# Patient Record
Sex: FEMALE | Race: BLACK OR AFRICAN AMERICAN
[De-identification: names, ages, dates, MRNs, and addresses within clinical notes are randomized per-mention and may not be internally consistent; named-entity substitution may affect disease eponyms.]

---

## 2016-05-14 VITALS — SYSTOLIC BLOOD PRESSURE: 128 MMHG | DIASTOLIC BLOOD PRESSURE: 70 MMHG

## 2017-05-11 ENCOUNTER — HOSPITAL ENCOUNTER (EMERGENCY)
Dept: HOSPITAL 63 - ER | Age: 62
Discharge: HOME | End: 2017-05-11
Payer: COMMERCIAL

## 2017-05-11 VITALS
SYSTOLIC BLOOD PRESSURE: 126 MMHG | SYSTOLIC BLOOD PRESSURE: 126 MMHG | SYSTOLIC BLOOD PRESSURE: 126 MMHG | SYSTOLIC BLOOD PRESSURE: 126 MMHG | DIASTOLIC BLOOD PRESSURE: 61 MMHG | DIASTOLIC BLOOD PRESSURE: 61 MMHG | DIASTOLIC BLOOD PRESSURE: 61 MMHG | DIASTOLIC BLOOD PRESSURE: 61 MMHG | SYSTOLIC BLOOD PRESSURE: 126 MMHG | DIASTOLIC BLOOD PRESSURE: 61 MMHG

## 2017-05-11 VITALS — WEIGHT: 169 LBS | BODY MASS INDEX: 31.1 KG/M2 | HEIGHT: 62 IN

## 2017-05-11 DIAGNOSIS — R07.89: Primary | ICD-10-CM

## 2017-05-11 DIAGNOSIS — J45.909: ICD-10-CM

## 2017-05-11 DIAGNOSIS — R06.02: ICD-10-CM

## 2017-05-11 DIAGNOSIS — E03.9: ICD-10-CM

## 2017-05-11 DIAGNOSIS — I10: ICD-10-CM

## 2017-05-11 LAB
ALBUMIN SERPL-MCNC: 3.8 G/DL (ref 3.4–5)
ALBUMIN/GLOB SERPL: 1.1 {RATIO} (ref 1–1.7)
ALP SERPL-CCNC: 77 U/L (ref 46–116)
ALT SERPL-CCNC: 31 U/L (ref 14–59)
ANION GAP SERPL CALC-SCNC: 11 MMOL/L (ref 6–14)
AST SERPL-CCNC: 23 U/L (ref 15–37)
BASOPHILS # BLD AUTO: 0.1 X10^3/UL (ref 0–0.2)
BASOPHILS NFR BLD: 1 % (ref 0–3)
BILIRUB SERPL-MCNC: 0.3 MG/DL (ref 0.2–1)
BUN/CREAT SERPL: 18 (ref 6–20)
CA-I SERPL ISE-MCNC: 18 MG/DL (ref 7–20)
CALCIUM SERPL-MCNC: 9.2 MG/DL (ref 8.5–10.1)
CHLORIDE SERPL-SCNC: 108 MMOL/L (ref 98–107)
CO2 SERPL-SCNC: 25 MMOL/L (ref 21–32)
CREAT SERPL-MCNC: 1 MG/DL (ref 0.6–1)
EOSINOPHIL NFR BLD: 0.3 X10^3/UL (ref 0–0.7)
EOSINOPHIL NFR BLD: 6 % (ref 0–3)
ERYTHROCYTE [DISTWIDTH] IN BLOOD BY AUTOMATED COUNT: 15.1 % (ref 11.5–14.5)
GFR SERPLBLD BASED ON 1.73 SQ M-ARVRAT: 68.7 ML/MIN
GLOBULIN SER-MCNC: 3.6 G/DL (ref 2.2–3.8)
GLUCOSE SERPL-MCNC: 98 MG/DL (ref 70–99)
HCT VFR BLD CALC: 37.4 % (ref 36–47)
HGB BLD-MCNC: 12.9 G/DL (ref 12–15.5)
LYMPHOCYTES # BLD: 3 X10^3/UL (ref 1–4.8)
LYMPHOCYTES NFR BLD AUTO: 51 % (ref 24–48)
MCH RBC QN AUTO: 28 PG (ref 25–35)
MCHC RBC AUTO-ENTMCNC: 35 G/DL (ref 31–37)
MCV RBC AUTO: 82 FL (ref 79–100)
MONO #: 0.4 X10^3/UL (ref 0–1.1)
MONOCYTES NFR BLD: 7 % (ref 0–9)
NEUT #: 2.1 X10^3UL (ref 1.8–7.7)
NEUTROPHILS NFR BLD AUTO: 36 % (ref 31–73)
PLATELET # BLD AUTO: 198 X10^3/UL (ref 140–400)
POTASSIUM SERPL-SCNC: 3.6 MMOL/L (ref 3.5–5.1)
PROT SERPL-MCNC: 7.4 G/DL (ref 6.4–8.2)
RBC # BLD AUTO: 4.54 X10^6/UL (ref 3.5–5.4)
SODIUM SERPL-SCNC: 144 MMOL/L (ref 136–145)
WBC # BLD AUTO: 5.9 X10^3/UL (ref 4–11)

## 2017-05-11 PROCEDURE — 93005 ELECTROCARDIOGRAM TRACING: CPT

## 2017-05-11 PROCEDURE — 85379 FIBRIN DEGRADATION QUANT: CPT

## 2017-05-11 PROCEDURE — 80053 COMPREHEN METABOLIC PANEL: CPT

## 2017-05-11 PROCEDURE — 84484 ASSAY OF TROPONIN QUANT: CPT

## 2017-05-11 PROCEDURE — 85027 COMPLETE CBC AUTOMATED: CPT

## 2017-05-11 PROCEDURE — 71010: CPT

## 2017-05-11 PROCEDURE — 83880 ASSAY OF NATRIURETIC PEPTIDE: CPT

## 2017-05-11 PROCEDURE — 36415 COLL VENOUS BLD VENIPUNCTURE: CPT

## 2017-05-11 NOTE — PHYS DOC
Past History


Past Medical History:  Asthma, Hypertension, Hyperthyroid


Past Surgical History:  Other


Smoking:  Non-smoker


Alcohol Use:  None


Drug Use:  None





Adult General


Chief Complaint


Chief Complaint:  CHEST PAIN





HPI


HPI





Patient is a 61-year-old female who presents today to the ER complaining of left

-sided sharp chest pain that started around 4 PM today. Patient reports that he'

s been intermittent and lasts for a couple seconds and is resolved. Patient 

reports that the discomfort was associated with some shortness of breath. 

Patient reports that she feels that she has multiple PVCs which she has a 

history of. Patient has any fevers shakes chills nausea vomiting diarrhea cough 

cold runny nose. Patient denies any pain radiating to her arms. Patient denies 

any weakness to her upper or lower 70s. Patient has any change in the 

discomfort to exertion or rest meals. Patient reports it does hurt sometimes 

when she takes a deep breath in. Patient reports the pain is sharp in nature 

and again last for seconds when it comes on in the gets resolved. She has a 

history of hypertension. Patient does not have a history of coronary artery 

disease, diabetes, liver longer kidney problems. Patient does have a history of 

hypothyroid. Patient had her tonsils taken out as well as a  in the 

past. Patient does not smoke drink or do drugs. Patient has no family history 

of present CAD in the past. Patient is allergic to any medications. Patient 

reports that she's had a history of PVCs and she currently has an appointment 

to see her cardiologist the  for another echocardiogram. Patient reports that 

she had an exercise stress test in  which was normal. Her cardiologist Dr. Moss from . Patient reports she had an echocardiogram at that time was also 

normal. Patient is currently pain-free in the ER and has been pain-free 

throughout her evaluation here.  Patient denies any orthopnea PND or dyspnea on 

exertion.





Patient's physical exam was unremarkable. Patient heart exam was regular rate 

and rhythm with occasional ectopic beats. Patient's lungs are clear without any 

wheezing rales or rhonchi. Patient has no bipedal edema. Patient has no calf 

tenderness or Homans sign. Patient has no split S2 or if heave.





Patient's ER workup revealed unremarkable EKG. Patient's EKG was normal sinus 

rhythm at a heart rate of 60 with multiple PVCs. Patient reports that this is 

normal for her. Patient's CBC CMP and troponin were all within normal limits. 

Patient had a slightly elevated BNP however her chest x-ray was normal with a 

normal heart size no infiltrates or effusions and no evidence of CHF.





Assessment and plan this is a 61-year-old female who presents here today 

complaining of left-sided chest discomfort that was sharp in nature and appears 

to be atypical for cardiac etiology. I discussed with the patient the 

limitations of and ER evaluation and discussed with her that although I'm 

unable to rule out or rule in a cardiac source for her discomfort she would be 

considered to be low risk for coronary artery disease. I discussed with the 

patient that I would not be able to rule out a cardiac source of her pain and 

would recommend inpatient evaluation at this time in order to rule out 

completely. Patient reports that she does not feel that in admission is 

indicated at this time and would prefer to be discharged home. Utilizing shared 

decision making I did discuss the patient all the risks and benefits of being 

admitted versus going home. I did discuss with her the potential of arrhythmia 

microinfarction death of the potential possibility if this was a cardiac 

source. Patient feels comfortable going home and feels that this is unlikely a 

cardiac etiology. Patient agrees to follow-up with her cardiologist as soon as 

possible for further workup. Patient reports that she will return to the ER if 

the pain returns. Patient reports that she will return the ER. He changes her 

mind regarding addition to the hospital.





Review of Systems


Review of Systems





Constitutional: Denies fever or chills []


Eyes: Denies change in visual acuity, redness, or eye pain []


All other review systems are negative except as documented in the history of 

present illness portion.





Allergies


Allergies





Allergies








Coded Allergies Type Severity Reaction Last Updated Verified


 


  No Known Drug Allergies    17 No











Physical Exam


Physical Exam





Constitutional: Well developed, well nourished, no acute distress, non-toxic 

appearance. []


HENT: Normocephalic, atraumatic, bilateral external ears normal, oropharynx 

moist, no oral exudates, nose normal. []


Eyes: PERRLA, EOMI, conjunctiva normal, no discharge. [] 


Neck: Normal range of motion, no tenderness, supple, no stridor. [] 


Cardiovascular:Heart rate regular with


Lungs & Thorax:  Bilateral breath sounds clear to auscultation []


Abdomen: Bowel sounds normal, soft, no tenderness, no masses, no pulsatile 

masses. [] 


Skin: Warm, dry, no erythema, no rash. [] 


Back: No tenderness, no CVA tenderness. [] 


Extremities: No tenderness, no cyanosis, no clubbing, ROM intact, no edema. [] 


Neurologic: Alert and oriented X 3, normal motor function, normal sensory 

function, no focal deficits noted. []


Psychologic: Affect normal, judgement normal, mood normal. []





Current Patient Data


Lab Results











Test


  17


21:17


 


White Blood Count 5.9 x10^3/uL 


 


Red Blood Count 4.54 x10^6/uL 


 


Hemoglobin 12.9 g/dL 


 


Hematocrit 37.4 % 


 


Mean Corpuscular Volume 82 fL 


 


Mean Corpuscular Hemoglobin 28 pg 


 


Mean Corpuscular Hemoglobin


Concent 35 g/dL 


 


 


Red Cell Distribution Width 15.1 % 


 


Platelet Count 198 x10^3/uL 


 


Neutrophils (%) (Auto) 36 % 


 


Lymphocytes (%) (Auto) 51 % 


 


Monocytes (%) (Auto) 7 % 


 


Eosinophils (%) (Auto) 6 % 


 


Basophils (%) (Auto) 1 % 


 


Neutrophils # (Auto) 2.1 x10^3uL 


 


Lymphocytes # (Auto) 3.0 x10^3/uL 


 


Monocytes # (Auto) 0.4 x10^3/uL 


 


Eosinophils # (Auto) 0.3 x10^3/uL 


 


Basophils # (Auto) 0.1 x10^3/uL 


 


D-Dimer (Stefani) 0.23 mg/L 


 


Sodium Level 144 mmol/L 


 


Potassium Level 3.6 mmol/L 


 


Chloride Level 108 mmol/L 


 


Carbon Dioxide Level 25 mmol/L 


 


Anion Gap 11 


 


Blood Urea Nitrogen 18 mg/dL 


 


Creatinine 1.0 mg/dL 


 


Estimated GFR


(Cockcroft-Gault) 68.7 


 


 


BUN/Creatinine Ratio 18 


 


Glucose Level 98 mg/dL 


 


Calcium Level 9.2 mg/dL 


 


Total Bilirubin 0.3 mg/dL 


 


Aspartate Amino Transf


(AST/SGOT) 23 U/L 


 


 


Alanine Aminotransferase


(ALT/SGPT) 31 U/L 


 


 


Alkaline Phosphatase 77 U/L 


 


Troponin I Quantitative < 0.017 ng/mL 


 


NT-Pro-B-Type Natriuretic


Peptide 371 pg/mL 


 


 


Total Protein 7.4 g/dL 


 


Albumin 3.8 g/dL 


 


Albumin/Globulin Ratio 1.1 








 Laboratory Tests








Test


  17


21:17


 


White Blood Count


  5.9 x10^3/uL


(4.0-11.0)


 


Red Blood Count


  4.54 x10^6/uL


(3.50-5.40)


 


Hemoglobin


  12.9 g/dL


(12.0-15.5)


 


Hematocrit


  37.4 %


(36.0-47.0)


 


Mean Corpuscular Volume


  82 fL ()


 


 


Mean Corpuscular Hemoglobin 28 pg (25-35)  


 


Mean Corpuscular Hemoglobin


Concent 35 g/dL


(31-37)


 


Red Cell Distribution Width


  15.1 %


(11.5-14.5)  H


 


Platelet Count


  198 x10^3/uL


(140-400)


 


Neutrophils (%) (Auto) 36 % (31-73)  


 


Lymphocytes (%) (Auto) 51 % (24-48)  H


 


Monocytes (%) (Auto) 7 % (0-9)  


 


Eosinophils (%) (Auto) 6 % (0-3)  H


 


Basophils (%) (Auto) 1 % (0-3)  


 


Neutrophils # (Auto)


  2.1 x10^3uL


(1.8-7.7)


 


Lymphocytes # (Auto)


  3.0 x10^3/uL


(1.0-4.8)


 


Monocytes # (Auto)


  0.4 x10^3/uL


(0.0-1.1)


 


Eosinophils # (Auto)


  0.3 x10^3/uL


(0.0-0.7)


 


Basophils # (Auto)


  0.1 x10^3/uL


(0.0-0.2)


 


D-Dimer (Stefani)


  0.23 mg/L


(0.00-0.50)


 


Sodium Level


  144 mmol/L


(136-145)


 


Potassium Level


  3.6 mmol/L


(3.5-5.1)


 


Chloride Level


  108 mmol/L


()  H


 


Carbon Dioxide Level


  25 mmol/L


(21-32)


 


Anion Gap 11 (6-14)  


 


Blood Urea Nitrogen


  18 mg/dL


(7-20)


 


Creatinine


  1.0 mg/dL


(0.6-1.0)


 


Estimated GFR


(Cockcroft-Gault) 68.7  


 


 


BUN/Creatinine Ratio 18 (6-20)  


 


Glucose Level


  98 mg/dL


(70-99)


 


Calcium Level


  9.2 mg/dL


(8.5-10.1)


 


Total Bilirubin


  0.3 mg/dL


(0.2-1.0)


 


Aspartate Amino Transferase


(AST) 23 U/L (15-37)


 


 


Alanine Aminotransferase (ALT)


  31 U/L (14-59)


 


 


Alkaline Phosphatase


  77 U/L


()


 


Troponin I Quantitative


  < 0.017 ng/mL


(0-0.055)


 


NT-Pro-B-Type Natriuretic


Peptide 371 pg/mL


(0-124)  H


 


Total Protein


  7.4 g/dL


(6.4-8.2)


 


Albumin


  3.8 g/dL


(3.4-5.0)


 


Albumin/Globulin Ratio 1.1 (1.0-1.7)  











EKG


EKG


[]





Radiology/Procedures


Radiology/Procedures


[]





Course & Med Decision Making


Course & Med Decision Making


Pertinent Labs and Imaging studies reviewed. (See chart for details)





[]





Dragon Disclaimer


Dragon Disclaimer


This chart was dictated in whole or in part using Voice Recognition software in 

a busy, high-work load, and often noisy Emergency Department environment.  It 

may contain unintended and wholly unrecognized errors or omissions.





Departure


Departure:


Impression:  


 Primary Impression:  


 Nonspecific chest pain


Disposition:  01 HOME, SELF-CARE


Condition:  IMPROVED


Referrals:  


RADHA DOUGLAS MD (PCP)


Patient Instructions:  Chest Pain (Nonspecific)





Additional Instructions:  


Please follow up with your cardiologist as soon as possible. Please call in the 

morning to make an appointment. Please return to the ER if you change your mind 

about feeling the need to be admitted to the hospital. Please return to the ER 

if you have any recurring chest pain, shortness of breath, or any other 

symptoms that are concerning to you.











MARV JACOBSEN MD May 11, 2017 22:22

## 2017-05-11 NOTE — EKG
[facility] [address], [place] [address]

Test Date:    2017               Test Time:    20:51:20

Pat Name:     DANITA BRAVO           Department:   

Patient ID:   [id number]           Room:          

Gender:       F                        Technician:   

:          [id number]               Requested By: MARV JACOBSEN

Order Number: [id number]            Reading MD:   Say Buck

                                 Measurements

Intervals                              Axis          

Rate:         69                       P:            34

KS:           136                      QRS:          15

QRSD:         78                       T:            24

QT:           402                                    

QTc:          432                                    

                           Interpretive Statements

SINUS RHYTHM

COMPLEX(ES) WITH ABERRANT INTRAVENTRICULAR CONDUCTION

VENTRICULAR PREMATURE COMPLEX(ES)



Electronically Signed On 5- 9:33:12 CDT by Say Buck

## 2017-05-12 NOTE — RAD
Exam performed: One view chest. 



Indication: chest pain today



Date of Service: 5/11/2017 11:01 PM  Comparison: 03/04/14.



Single AP upright portable view chest findings:



Cardiomediastinal silhouette is within limits of normal.  No acute

infiltrates, effusion or pneumothorax is detected.  The bony structures are

normal. 



Impression:



No acute cardiopulmonary process is detected.

## 2017-10-12 ENCOUNTER — HOSPITAL ENCOUNTER (OUTPATIENT)
Dept: HOSPITAL 63 - MAMMO | Age: 62
Discharge: HOME | End: 2017-10-12
Payer: COMMERCIAL

## 2017-10-12 DIAGNOSIS — Z12.31: Primary | ICD-10-CM

## 2017-10-13 NOTE — RAD
DATE: 10/12/2017



EXAM: DIGITAL SCREEN BILAT W/CAD



HISTORY: Screening



COMPARISON: 8/19/2016



This study was interpreted with the benefit of Computerized Aided Detection

(CAD).



FINDINGS:



Breast Density: HETERO  The breast parenchyma Is heterogeneiously dense, which

could reduce sensitivity of mammography. Breast parenchyma level C. The

history of breast reduction is noted



 There has not been a significant change in the appearance of the breasts

compared to the previous  







IMPRESSION: Benign finding







BI-RADS CATEGORY: 2 BENIGN FINDING(S)



RECOMMENDED FOLLOW-UP: 12M 12 MONTH FOLLOW-UP



PQRS compliance statement: Patient information was entered into a reminder

system with a target due date 10/12/2018 for the next mammogram.



Mammography is a sensitive method for finding small breast cancers, but it

does not detect them all and is not a substitute for careful clinical

examination.  A negative mammogram does not negate a clinically suspicious

finding and should not result in delay in biopsying a clinically suspicious

abnormality.



"Our facility is accredited by the American College [employer] Mammography

Program."

## 2017-12-21 ENCOUNTER — HOSPITAL ENCOUNTER (OUTPATIENT)
Dept: HOSPITAL 61 - PNCL | Age: 62
Discharge: HOME | End: 2017-12-21
Attending: ANESTHESIOLOGY
Payer: COMMERCIAL

## 2017-12-21 DIAGNOSIS — Z86.69: ICD-10-CM

## 2017-12-21 DIAGNOSIS — I10: ICD-10-CM

## 2017-12-21 DIAGNOSIS — E66.9: ICD-10-CM

## 2017-12-21 DIAGNOSIS — Z79.82: ICD-10-CM

## 2017-12-21 DIAGNOSIS — M50.10: ICD-10-CM

## 2017-12-21 DIAGNOSIS — Z98.890: ICD-10-CM

## 2017-12-21 DIAGNOSIS — M51.16: Primary | ICD-10-CM

## 2017-12-21 DIAGNOSIS — E03.9: ICD-10-CM

## 2017-12-21 DIAGNOSIS — F41.9: ICD-10-CM

## 2017-12-21 DIAGNOSIS — Z87.39: ICD-10-CM

## 2017-12-21 PROCEDURE — 62323 NJX INTERLAMINAR LMBR/SAC: CPT

## 2017-12-21 NOTE — PAIN
DATE OF SERVICE:  12/21/2017



PROGRESS NOTE FOR PAIN CLINIC



DIAGNOSES:  

1.  Cervical radiculopathy with cervical degenerative disk disease.

2.  Lumbar radiculopathy with lumbar degenerative disk disease.



HISTORY OF PRESENT ILLNESS:  The patient is a 62-year-old female who returns for

followup status post cervical epidural steroid injections, most recently seen on

08/23/2016.  The patient did very well near 100% improvement with her neck pain.

 Her main complaint today is low back and bilateral lower extremity pain.  The

patient has had difficulty with this about a year ago to 2 years ago and I did

very well with lumbar epidural steroid injection at that time.  The patient

reports pain across the low back into the bilateral posterior gluteus, posterior

thighs and calves radiating bilaterally, essentially right equal to left; worse

with walking, standing, change in positions.  The patient reports the pain is

aching, sharp, dull tight shooting.  Pain is stabbing, severe, becoming more

radiating and constant.  The patient reports it is worse at night, does awaken

her from sleep at least 2-3 times a night but not every night.  She needs to

reposition or sleep with a heating pad on her back and this helps.  The patient

reports pain at 8 on scale 10 on average, 10 on its worse and is a 6 at its

least and is a 6 on a scale of 10 today.  The patient reports no motor or

sensory deficits, no bowel or bladder incontinence or other complaints but still

significant pain is noted.



PHYSICAL EXAMINATION:

VITAL SIGNS:  The patient's blood pressure 131/79, pulse 65, respirations 16,

temperature 97.9 degrees Fahrenheit, height is 5 feet 2 inches and weight is 166

pounds.

GENERAL:  The patient is awake, alert, oriented, appropriate and very pleasant

demeanor.

HEENT:  Shows normocephalic and atraumatic.  Extraocular movements are intact

and symmetrical.  Oral cavity:  Mucous membranes moist and pink.  Dentition is

intact.

NECK:  Shows anterior throat supple without palpable lymphadenopathy noted. 

Swallow reflex symmetrical.

CHEST:  Shows normal with inspection.  Breath sounds clear to auscultation

bilaterally.

HEART:  Shows S1 and S2 clear.  No murmurs auscultated.

ABDOMEN:  Obese but soft, nontender and nondistended.  No palpable organomegaly

is noted.  No rebound or guarding demonstrated.

BACK:  Shows spine grossly in the midline.  Normal-appearing cervical lordosis,

thoracic kyphotic curvature and lumbar lordotic curvature.  No previous bruises,

lesions, rashes or scars are noted.  Lumbar paraspinous muscle shows symmetrical

inspection with palpation shows some moderate tenderness but only diffusely

bilaterally with palpation.  No trigger points or radiation.  The patient has

good rotational motion of the lumbar spine, both laterally greater than 10

degrees right and left well as extension greater than 10 degrees, forward

flexion 45 degrees without difficulty.  No tenderness over the sacrum or

sacroiliac regions.

EXTREMITIES:  Lower extremities show deep tendon reflexes at 2+ in the patellar,

1+ tendo-calcaneus tendons are equal.  Motor exam is strong with 5/5

dorsiflexion, extension, quadriceps and hamstring flexion equal.  Peripheral

pulses are 1+ posterior tibial and dorsalis pedis.  No peripheral edema is noted

bilaterally.



Options were discussed with the patient.  The patient's old chart was used as

well as her current medication regimen updated.  Current review of systems

updated today as well.  We will proceed with lumbar epidural steroid injections,

the first in this series with fluoroscopic guidance.  Risks were again discussed

including, but not limited to bleeding, infection, possibility of epidural

hematoma and subsequent neurological compromise, dural puncture, headaches,

spinal cord and/or nerve damage, side effects of steroid medication and poor

results regarding pain control.  The patient understands and wished to proceed. 

The patient returned to the clinic in approximately 2 weeks for followup, was

counseled to return appointment, activity level and side effects to be aware of.



DIAGNOSIS:  Lumbar radiculopathy with lumbar degenerative disk disease.



PROCEDURES:  Lumbar epidural steroid injection, translaminar approach, L5-S1

level using C-arm fluoroscopic guidance under sterile prep and drape using local

anesthetic.  



MEDICATION INJECTED:  A total of 120 mg Depo-Medrol plus 10 mL of

preservative-free normal saline and 2 mL of Isovue for contrast.



CONDITION AT DISCHARGE:  Stable.  The patient tolerated procedure well, had no

complications.

 



______________________________

ENIO ROMO MD



DR:  JOHANNE/kartik  JOB#:  [id number] / [id number]

DD:  12/21/2017 14:50  DT:  12/21/2017 23:43

## 2018-06-29 ENCOUNTER — HOSPITAL ENCOUNTER (OUTPATIENT)
Dept: HOSPITAL 63 - CT | Age: 63
Discharge: HOME | End: 2018-06-29
Attending: FAMILY MEDICINE
Payer: COMMERCIAL

## 2018-06-29 DIAGNOSIS — G43.009: ICD-10-CM

## 2018-06-29 DIAGNOSIS — I10: ICD-10-CM

## 2018-06-29 DIAGNOSIS — H53.8: Primary | ICD-10-CM

## 2018-06-29 DIAGNOSIS — J45.909: ICD-10-CM

## 2018-06-29 DIAGNOSIS — E03.9: ICD-10-CM

## 2018-06-29 PROCEDURE — 70450 CT HEAD/BRAIN W/O DYE: CPT

## 2018-06-29 NOTE — RAD
CT HEAD WITHOUT CONTRAST  6/29/2018 9:43 AM

 

Indication:   HEADACHE WITH BLURRED VISION TIMES 2 MONTHS

 

Comparison: CT of the head without contrast August 5, 2011

 

Procedure: Multidetector CT imaging of the head was performed without the 

administration of contrast.

 

Findings: There is no evidence of acute intracranial hemorrhage. There is 

no evidence of acute territorial infarction. Please note that CT is 

limited for evaluation of acute ischemia. No mass effect or midline shift 

is identified . The ventricles and basilar cisterns have an appropriate 

appearance. No abnormal extra-axial fluid collections are seen. No acute 

osseous changes are identified.

 

Impression: No evidence of acute intracranial abnormality

 

Electronically signed by: Roland Rojas MD (6/29/2018 1:38 PM) [id number]

## 2018-07-10 ENCOUNTER — HOSPITAL ENCOUNTER (OUTPATIENT)
Dept: HOSPITAL 63 - DXRAD | Age: 63
Discharge: HOME | End: 2018-07-10
Attending: FAMILY MEDICINE
Payer: COMMERCIAL

## 2018-07-10 DIAGNOSIS — I10: ICD-10-CM

## 2018-07-10 DIAGNOSIS — M77.32: Primary | ICD-10-CM

## 2018-07-10 DIAGNOSIS — G43.009: ICD-10-CM

## 2018-07-10 DIAGNOSIS — E03.9: ICD-10-CM

## 2018-07-10 DIAGNOSIS — J45.909: ICD-10-CM

## 2018-07-10 PROCEDURE — 73630 X-RAY EXAM OF FOOT: CPT

## 2018-07-10 NOTE — RAD
Three-view left foot dated 7/10/2018.

 

No comparison available.

 

Clinical indication: Pain after twisting injury.

 

FINDINGS:

 

3 views left foot show normal bony alignment. No displaced fracture. No 

acute osseous or articular abnormality. Small calcaneal spur.

 

IMPRESSION:

 

No acute radiographic abnormality.

 

Electronically signed by: Jose Wray MD (7/10/2018 3:03 PM) 

[id number]

## 2018-08-14 ENCOUNTER — HOSPITAL ENCOUNTER (INPATIENT)
Dept: HOSPITAL 63 - 1 SOUTH | Age: 63
LOS: 2 days | Discharge: HOME | DRG: 392 | End: 2018-08-16
Attending: FAMILY MEDICINE | Admitting: FAMILY MEDICINE
Payer: COMMERCIAL

## 2018-08-14 ENCOUNTER — HOSPITAL ENCOUNTER (OUTPATIENT)
Dept: HOSPITAL 63 - CT | Age: 63
Discharge: HOME | End: 2018-08-14
Attending: FAMILY MEDICINE
Payer: COMMERCIAL

## 2018-08-14 VITALS — SYSTOLIC BLOOD PRESSURE: 132 MMHG | DIASTOLIC BLOOD PRESSURE: 82 MMHG

## 2018-08-14 VITALS — BODY MASS INDEX: 30.22 KG/M2 | WEIGHT: 164.25 LBS | HEIGHT: 62 IN

## 2018-08-14 DIAGNOSIS — I10: ICD-10-CM

## 2018-08-14 DIAGNOSIS — E03.9: ICD-10-CM

## 2018-08-14 DIAGNOSIS — G43.009: ICD-10-CM

## 2018-08-14 DIAGNOSIS — Z82.49: ICD-10-CM

## 2018-08-14 DIAGNOSIS — M47.9: ICD-10-CM

## 2018-08-14 DIAGNOSIS — A09: Primary | ICD-10-CM

## 2018-08-14 DIAGNOSIS — Z82.3: ICD-10-CM

## 2018-08-14 DIAGNOSIS — Z79.899: ICD-10-CM

## 2018-08-14 DIAGNOSIS — J45.909: ICD-10-CM

## 2018-08-14 DIAGNOSIS — R10.13: Primary | ICD-10-CM

## 2018-08-14 DIAGNOSIS — Z88.8: ICD-10-CM

## 2018-08-14 LAB
ALBUMIN SERPL-MCNC: 4.6 G/DL (ref 3.4–5)
ALBUMIN/GLOB SERPL: 1 {RATIO} (ref 1–1.7)
ALP SERPL-CCNC: 93 U/L (ref 46–116)
ALT SERPL-CCNC: 24 U/L (ref 14–59)
AMYLASE SERPL-CCNC: 71 U/L (ref 25–115)
ANION GAP SERPL CALC-SCNC: 10 MMOL/L (ref 6–14)
AST SERPL-CCNC: 20 U/L (ref 15–37)
BASOPHILS # BLD AUTO: 0 X10^3/UL (ref 0–0.2)
BASOPHILS NFR BLD: 1 % (ref 0–3)
BILIRUB SERPL-MCNC: 0.6 MG/DL (ref 0.2–1)
BUN/CREAT SERPL: 14 (ref 6–20)
CA-I SERPL ISE-MCNC: 13 MG/DL (ref 7–20)
CALCIUM SERPL-MCNC: 9.9 MG/DL (ref 8.5–10.1)
CHLORIDE SERPL-SCNC: 101 MMOL/L (ref 98–107)
CO2 SERPL-SCNC: 27 MMOL/L (ref 21–32)
CREAT SERPL-MCNC: 0.9 MG/DL (ref 0.6–1)
EOSINOPHIL NFR BLD: 0.1 X10^3/UL (ref 0–0.7)
EOSINOPHIL NFR BLD: 2 % (ref 0–3)
ERYTHROCYTE [DISTWIDTH] IN BLOOD BY AUTOMATED COUNT: 15.4 % (ref 11.5–14.5)
GFR SERPLBLD BASED ON 1.73 SQ M-ARVRAT: 77 ML/MIN
GLOBULIN SER-MCNC: 4.4 G/DL (ref 2.2–3.8)
GLUCOSE SERPL-MCNC: 104 MG/DL (ref 70–99)
HCT VFR BLD CALC: 44.1 % (ref 36–47)
HGB BLD-MCNC: 14.9 G/DL (ref 12–15.5)
LIPASE: 97 U/L (ref 73–393)
LYMPHOCYTES # BLD: 2 X10^3/UL (ref 1–4.8)
LYMPHOCYTES NFR BLD AUTO: 33 % (ref 24–48)
MCH RBC QN AUTO: 29 PG (ref 25–35)
MCHC RBC AUTO-ENTMCNC: 34 G/DL (ref 31–37)
MCV RBC AUTO: 85 FL (ref 79–100)
MONO #: 0.4 X10^3/UL (ref 0–1.1)
MONOCYTES NFR BLD: 6 % (ref 0–9)
NEUT #: 3.5 X10^3UL (ref 1.8–7.7)
NEUTROPHILS NFR BLD AUTO: 58 % (ref 31–73)
PLATELET # BLD AUTO: 283 X10^3/UL (ref 140–400)
POTASSIUM SERPL-SCNC: 4 MMOL/L (ref 3.5–5.1)
PROT SERPL-MCNC: 9 G/DL (ref 6.4–8.2)
RBC # BLD AUTO: 5.18 X10^6/UL (ref 3.5–5.4)
SODIUM SERPL-SCNC: 138 MMOL/L (ref 136–145)
WBC # BLD AUTO: 6 X10^3/UL (ref 4–11)

## 2018-08-14 PROCEDURE — 83690 ASSAY OF LIPASE: CPT

## 2018-08-14 PROCEDURE — 80053 COMPREHEN METABOLIC PANEL: CPT

## 2018-08-14 PROCEDURE — 36415 COLL VENOUS BLD VENIPUNCTURE: CPT

## 2018-08-14 PROCEDURE — 82150 ASSAY OF AMYLASE: CPT

## 2018-08-14 PROCEDURE — 85025 COMPLETE CBC W/AUTO DIFF WBC: CPT

## 2018-08-14 PROCEDURE — 80048 BASIC METABOLIC PNL TOTAL CA: CPT

## 2018-08-14 PROCEDURE — 74176 CT ABD & PELVIS W/O CONTRAST: CPT

## 2018-08-14 PROCEDURE — 87045 FECES CULTURE AEROBIC BACT: CPT

## 2018-08-14 RX ADMIN — SODIUM CHLORIDE PRN MLS/HR: 0.45 INJECTION, SOLUTION INTRAVENOUS at 23:53

## 2018-08-14 NOTE — RAD
Abdominal and Pelvis CT, Without Contrast:

 

History: One week of epigastric pain.

 

Comparison: November 24, 2015.

 

Procedure: Axial images are obtained of the abdomen and pelvis, with oral 

contrast only.

 

CT Abdomen without Contrast:

 

Findings:

 

Evaluation of solid organs is limited without contrast.

 

Liver: Normal.

 

Spleen: Normal.

 

Pancreas: Normal.

 

 

Adrenal Glands: Normal.

 

Kidneys: There is a tiny punctate nonobstructive stone in the right renal 

pelvis.

 

There is no free air or free fluid.

 

There is no lymphadenopathy.

 

Impression:

 

Please see CT Pelvis without Contrast.

 

End Impression.

 

CT Pelvis without Contrast:

 

Findings:

 

The urinary bladder appears normal.

 

There is a trace of free fluid.  There is no lymphadenopathy.

 

The appendix is not well seen however there is a small tubular structure 

posterior to the cecum which could be a normal collapsed appendix. There 

is a long segment of the distal ileum and terminal ileum which is somewhat

aneurysmal and has mild wall thickening and minimal surrounding 

inflammation.

 

Impression:

 

1. Long segment of the distal ileum and terminal ileum that has mild wall 

thickening and mild aneurysmal change with minimal surrounding 

inflammation. This could be inflammatory such as Crohn's disease or could 

be infectious including Yersinia or Shigella or Salmonella. Occasionally 

small bowel lymphoma can have this appearance. 

2. Trace of free fluid.

 

PQRS Compliance Statement:

 

One or more of the following individualized dose reduction techniques were

utilized for this examination:  

1. Automated exposure control  

2. Adjustment of the mA and/or kV according to patient size  

3. Use of iterative reconstruction technique

 

Electronically signed by: George Liu III, MD (8/14/2018 8:37 PM) [facility]

## 2018-08-15 VITALS — DIASTOLIC BLOOD PRESSURE: 72 MMHG | SYSTOLIC BLOOD PRESSURE: 116 MMHG

## 2018-08-15 VITALS — DIASTOLIC BLOOD PRESSURE: 76 MMHG | SYSTOLIC BLOOD PRESSURE: 126 MMHG

## 2018-08-15 VITALS — DIASTOLIC BLOOD PRESSURE: 74 MMHG | SYSTOLIC BLOOD PRESSURE: 123 MMHG

## 2018-08-15 VITALS — SYSTOLIC BLOOD PRESSURE: 119 MMHG | DIASTOLIC BLOOD PRESSURE: 70 MMHG

## 2018-08-15 VITALS — DIASTOLIC BLOOD PRESSURE: 68 MMHG | SYSTOLIC BLOOD PRESSURE: 107 MMHG

## 2018-08-15 LAB
ANION GAP SERPL CALC-SCNC: 12 MMOL/L (ref 6–14)
BASOPHILS # BLD AUTO: 0 X10^3/UL (ref 0–0.2)
BASOPHILS NFR BLD: 0 % (ref 0–3)
CA-I SERPL ISE-MCNC: 11 MG/DL (ref 7–20)
CALCIUM SERPL-MCNC: 8.6 MG/DL (ref 8.5–10.1)
CHLORIDE SERPL-SCNC: 102 MMOL/L (ref 98–107)
CO2 SERPL-SCNC: 26 MMOL/L (ref 21–32)
CREAT SERPL-MCNC: 1 MG/DL (ref 0.6–1)
EOSINOPHIL NFR BLD: 0.1 X10^3/UL (ref 0–0.7)
EOSINOPHIL NFR BLD: 3 % (ref 0–3)
ERYTHROCYTE [DISTWIDTH] IN BLOOD BY AUTOMATED COUNT: 15.5 % (ref 11.5–14.5)
GFR SERPLBLD BASED ON 1.73 SQ M-ARVRAT: 68.2 ML/MIN
GLUCOSE SERPL-MCNC: 113 MG/DL (ref 70–99)
HCT VFR BLD CALC: 40.8 % (ref 36–47)
HGB BLD-MCNC: 13.7 G/DL (ref 12–15.5)
LYMPHOCYTES # BLD: 1.7 X10^3/UL (ref 1–4.8)
LYMPHOCYTES NFR BLD AUTO: 34 % (ref 24–48)
MCH RBC QN AUTO: 29 PG (ref 25–35)
MCHC RBC AUTO-ENTMCNC: 34 G/DL (ref 31–37)
MCV RBC AUTO: 86 FL (ref 79–100)
MONO #: 0.3 X10^3/UL (ref 0–1.1)
MONOCYTES NFR BLD: 5 % (ref 0–9)
NEUT #: 2.9 X10^3UL (ref 1.8–7.7)
NEUTROPHILS NFR BLD AUTO: 58 % (ref 31–73)
PLATELET # BLD AUTO: 248 X10^3/UL (ref 140–400)
POTASSIUM SERPL-SCNC: 3.5 MMOL/L (ref 3.5–5.1)
RBC # BLD AUTO: 4.75 X10^6/UL (ref 3.5–5.4)
SODIUM SERPL-SCNC: 140 MMOL/L (ref 136–145)
WBC # BLD AUTO: 5.1 X10^3/UL (ref 4–11)

## 2018-08-15 RX ADMIN — ALBUTEROL SULFATE SCH PUFF: 90 AEROSOL, METERED RESPIRATORY (INHALATION) at 12:58

## 2018-08-15 RX ADMIN — FLECAINIDE ACETATE SCH MG: 50 TABLET ORAL at 21:19

## 2018-08-15 RX ADMIN — LEVOTHYROXINE SODIUM SCH MCG: 125 TABLET ORAL at 08:34

## 2018-08-15 RX ADMIN — METOPROLOL SUCCINATE SCH MG: 50 TABLET, EXTENDED RELEASE ORAL at 08:34

## 2018-08-15 RX ADMIN — SODIUM CHLORIDE PRN MLS/HR: 0.45 INJECTION, SOLUTION INTRAVENOUS at 21:38

## 2018-08-15 RX ADMIN — FLECAINIDE ACETATE SCH MG: 50 TABLET ORAL at 08:35

## 2018-08-15 RX ADMIN — SODIUM CHLORIDE PRN MLS/HR: 0.45 INJECTION, SOLUTION INTRAVENOUS at 13:00

## 2018-08-15 RX ADMIN — ALBUTEROL SULFATE SCH PUFF: 90 AEROSOL, METERED RESPIRATORY (INHALATION) at 21:00

## 2018-08-15 RX ADMIN — ALBUTEROL SULFATE SCH PUFF: 90 AEROSOL, METERED RESPIRATORY (INHALATION) at 08:36

## 2018-08-16 VITALS
SYSTOLIC BLOOD PRESSURE: 117 MMHG | SYSTOLIC BLOOD PRESSURE: 117 MMHG | DIASTOLIC BLOOD PRESSURE: 71 MMHG | DIASTOLIC BLOOD PRESSURE: 71 MMHG

## 2018-08-16 VITALS — DIASTOLIC BLOOD PRESSURE: 71 MMHG | SYSTOLIC BLOOD PRESSURE: 117 MMHG

## 2018-08-16 RX ADMIN — METOPROLOL SUCCINATE SCH MG: 50 TABLET, EXTENDED RELEASE ORAL at 08:31

## 2018-08-16 RX ADMIN — ALBUTEROL SULFATE SCH MG: 108 AEROSOL, METERED RESPIRATORY (INHALATION) at 09:00

## 2018-08-16 RX ADMIN — ALBUTEROL SULFATE SCH MG: 108 AEROSOL, METERED RESPIRATORY (INHALATION) at 09:48

## 2018-08-16 RX ADMIN — FLECAINIDE ACETATE SCH MG: 50 TABLET ORAL at 08:31

## 2018-08-16 RX ADMIN — LEVOTHYROXINE SODIUM SCH MCG: 125 TABLET ORAL at 06:07

## 2018-08-23 NOTE — DS
DATE OF DISCHARGE:  08/16/2018



HOSPITAL COURSE:  A 63-year-old female came in and she has been suffering for

about a week and tried to hang in, continuing her abdominal pain at home.  She

came in, she was noted to have on imaging an infectious colitis.  She has done

that the day before admission.  The patient noted a long segment of the distal

ileum and terminal ileum that was thickened and some aneurysm consistent with

inflammatory bowel disease.  The patient was checked for infectious etiologies

and there was none noted.  Otherwise, the patient made good progress during the

rest of her hospitalization.  She did have IV antibiotic therapy, which seemed

to help her colitis and she made significant progress to be able to be

discharged home.  Follow up as an outpatient and make further evaluation as an

outpatient with her GI doctor.



IMPRESSION:  Colitis of the terminal ileum, abdominal pain.  The patient will be

discharged home.  See EMRAD.  Decreased activity.  Low fiber diet for 2 weeks

and then high fiber.  We will have her follow up with the GI specialist.





______________________________

RADHA DOUGLAS MD



DR:  MONROE/kartik  JOB#:  [id number] / [id number]

DD:  08/23/2018 13:13  DT:  08/23/2018 13:29

## 2018-10-08 ENCOUNTER — HOSPITAL ENCOUNTER (OUTPATIENT)
Dept: HOSPITAL 63 - US | Age: 63
Discharge: HOME | End: 2018-10-08
Payer: COMMERCIAL

## 2018-10-08 DIAGNOSIS — I10: ICD-10-CM

## 2018-10-08 DIAGNOSIS — R10.84: Primary | ICD-10-CM

## 2018-10-08 PROCEDURE — A9537 TC99M MEBROFENIN: HCPCS

## 2018-10-08 PROCEDURE — 96374 THER/PROPH/DIAG INJ IV PUSH: CPT

## 2018-10-08 PROCEDURE — 78226 HEPATOBILIARY SYSTEM IMAGING: CPT

## 2018-10-08 PROCEDURE — 96375 TX/PRO/DX INJ NEW DRUG ADDON: CPT

## 2018-10-08 PROCEDURE — 76700 US EXAM ABDOM COMPLETE: CPT

## 2018-10-08 NOTE — RAD
Radionuclide hepatobiliary scan, 10/8/2018:

 

HISTORY: Abdominal pain

 

Following IV injection of 5.2 mCi of technetium 99m Choletec there was 

prompt uptake of the radionuclide from the blood stream by the liver. 

Activity is present in the gallbladder and bile ducts at 15 minutes. Small

bowel activity developed at 25 minutes. Additional imaging was then 

performed following oral ingestion of 8 ounces of the Boost oral 

supplement. This was utilized for the ejection fraction portion of the 

study in the absence of available cholecystokinin. The gallbladder 

ejection fraction was calculated at 84 percent.

 

IMPRESSION:

1. Normal radionuclide hepatobiliary scan.

2. The gallbladder ejection fraction is 84 percent.

 

Electronically signed by: Rick Moritz, MD (10/8/2018 12:27 PM) [facility]

## 2018-10-08 NOTE — RAD
Examination: Ultrasound abdomen complete

 

HISTORY: History of abdominal pain

 

COMPARISON: None available

 

FINDINGS:

 

The visualized IVC, aorta within normal limits of dimension.

 

The visualized pancreas grossly appears unremarkable.

 

The spleen measures 8.6 cm in length.

 

The right kidney measures 9.5 cm in length. The left kidney measures 10 

cm.

 

The echogenicity liver grossly appears unremarkable.

 

The liver measures 11.6 cm. No evidence of gallstones. The common bile 

duct measures 2.8 mm in diameter.

 

 

IMPRESSION:

 

Unremarkable visualized exam.

 

Electronically signed by: John Soriano MD (10/8/2018 9:35 AM) [place] [id number]

## 2018-12-08 ENCOUNTER — HOSPITAL ENCOUNTER (INPATIENT)
Dept: HOSPITAL 63 - 1 SOUTH | Age: 63
LOS: 1 days | Discharge: HOME | DRG: 312 | End: 2018-12-09
Attending: FAMILY MEDICINE | Admitting: FAMILY MEDICINE
Payer: COMMERCIAL

## 2018-12-08 VITALS — DIASTOLIC BLOOD PRESSURE: 81 MMHG | SYSTOLIC BLOOD PRESSURE: 129 MMHG

## 2018-12-08 VITALS — DIASTOLIC BLOOD PRESSURE: 80 MMHG | SYSTOLIC BLOOD PRESSURE: 124 MMHG

## 2018-12-08 VITALS — SYSTOLIC BLOOD PRESSURE: 134 MMHG | DIASTOLIC BLOOD PRESSURE: 75 MMHG

## 2018-12-08 VITALS — SYSTOLIC BLOOD PRESSURE: 136 MMHG | DIASTOLIC BLOOD PRESSURE: 75 MMHG

## 2018-12-08 VITALS — HEIGHT: 62 IN | WEIGHT: 163.56 LBS | BODY MASS INDEX: 30.1 KG/M2

## 2018-12-08 VITALS — DIASTOLIC BLOOD PRESSURE: 81 MMHG | SYSTOLIC BLOOD PRESSURE: 133 MMHG

## 2018-12-08 DIAGNOSIS — I10: ICD-10-CM

## 2018-12-08 DIAGNOSIS — R07.89: ICD-10-CM

## 2018-12-08 DIAGNOSIS — E03.9: ICD-10-CM

## 2018-12-08 DIAGNOSIS — R55: Primary | ICD-10-CM

## 2018-12-08 DIAGNOSIS — I49.3: ICD-10-CM

## 2018-12-08 LAB
ALBUMIN SERPL-MCNC: 3.9 G/DL (ref 3.4–5)
ALBUMIN/GLOB SERPL: 1.1 {RATIO} (ref 1–1.7)
ALP SERPL-CCNC: 67 U/L (ref 46–116)
ALT SERPL-CCNC: 26 U/L (ref 14–59)
ANION GAP SERPL CALC-SCNC: 12 MMOL/L (ref 6–14)
AST SERPL-CCNC: 23 U/L (ref 15–37)
BASOPHILS # BLD AUTO: 0.1 X10^3/UL (ref 0–0.2)
BASOPHILS NFR BLD: 1 % (ref 0–3)
BILIRUB SERPL-MCNC: 0.3 MG/DL (ref 0.2–1)
BUN/CREAT SERPL: 16 (ref 6–20)
CA-I SERPL ISE-MCNC: 18 MG/DL (ref 7–20)
CALCIUM SERPL-MCNC: 9.2 MG/DL (ref 8.5–10.1)
CHLORIDE SERPL-SCNC: 104 MMOL/L (ref 98–107)
CO2 SERPL-SCNC: 27 MMOL/L (ref 21–32)
CREAT SERPL-MCNC: 1.1 MG/DL (ref 0.6–1)
EOSINOPHIL NFR BLD: 0.2 X10^3/UL (ref 0–0.7)
EOSINOPHIL NFR BLD: 3 % (ref 0–3)
ERYTHROCYTE [DISTWIDTH] IN BLOOD BY AUTOMATED COUNT: 14.8 % (ref 11.5–14.5)
GFR SERPLBLD BASED ON 1.73 SQ M-ARVRAT: 61.1 ML/MIN
GLOBULIN SER-MCNC: 3.7 G/DL (ref 2.2–3.8)
GLUCOSE SERPL-MCNC: 90 MG/DL (ref 70–99)
HCT VFR BLD CALC: 38.2 % (ref 36–47)
HGB BLD-MCNC: 12.8 G/DL (ref 12–15.5)
LYMPHOCYTES # BLD: 2.9 X10^3/UL (ref 1–4.8)
LYMPHOCYTES NFR BLD AUTO: 48 % (ref 24–48)
MAGNESIUM SERPL-MCNC: 2 MG/DL (ref 1.8–2.4)
MCH RBC QN AUTO: 29 PG (ref 25–35)
MCHC RBC AUTO-ENTMCNC: 33 G/DL (ref 31–37)
MCV RBC AUTO: 87 FL (ref 79–100)
MONO #: 0.3 X10^3/UL (ref 0–1.1)
MONOCYTES NFR BLD: 5 % (ref 0–9)
NEUT #: 2.6 X10^3UL (ref 1.8–7.7)
NEUTROPHILS NFR BLD AUTO: 43 % (ref 31–73)
PLATELET # BLD AUTO: 234 X10^3/UL (ref 140–400)
POTASSIUM SERPL-SCNC: 3.8 MMOL/L (ref 3.5–5.1)
PROT SERPL-MCNC: 7.6 G/DL (ref 6.4–8.2)
RBC # BLD AUTO: 4.41 X10^6/UL (ref 3.5–5.4)
SODIUM SERPL-SCNC: 143 MMOL/L (ref 136–145)
WBC # BLD AUTO: 6.1 X10^3/UL (ref 4–11)

## 2018-12-08 PROCEDURE — 83735 ASSAY OF MAGNESIUM: CPT

## 2018-12-08 PROCEDURE — 85025 COMPLETE CBC W/AUTO DIFF WBC: CPT

## 2018-12-08 PROCEDURE — 93005 ELECTROCARDIOGRAM TRACING: CPT

## 2018-12-08 PROCEDURE — 71046 X-RAY EXAM CHEST 2 VIEWS: CPT

## 2018-12-08 PROCEDURE — 36415 COLL VENOUS BLD VENIPUNCTURE: CPT

## 2018-12-08 PROCEDURE — 84484 ASSAY OF TROPONIN QUANT: CPT

## 2018-12-08 PROCEDURE — 80053 COMPREHEN METABOLIC PANEL: CPT

## 2018-12-08 PROCEDURE — 84443 ASSAY THYROID STIM HORMONE: CPT

## 2018-12-08 PROCEDURE — 85379 FIBRIN DEGRADATION QUANT: CPT

## 2018-12-08 RX ADMIN — FLECAINIDE ACETATE SCH MG: 50 TABLET ORAL at 21:32

## 2018-12-09 VITALS
SYSTOLIC BLOOD PRESSURE: 107 MMHG | SYSTOLIC BLOOD PRESSURE: 107 MMHG | DIASTOLIC BLOOD PRESSURE: 68 MMHG | DIASTOLIC BLOOD PRESSURE: 68 MMHG | SYSTOLIC BLOOD PRESSURE: 107 MMHG | DIASTOLIC BLOOD PRESSURE: 68 MMHG

## 2018-12-09 VITALS — DIASTOLIC BLOOD PRESSURE: 79 MMHG | SYSTOLIC BLOOD PRESSURE: 130 MMHG

## 2018-12-09 PROCEDURE — 5A09357 ASSISTANCE WITH RESPIRATORY VENTILATION, LESS THAN 24 CONSECUTIVE HOURS, CONTINUOUS POSITIVE AIRWAY PRESSURE: ICD-10-PCS | Performed by: FAMILY MEDICINE

## 2018-12-09 RX ADMIN — FLECAINIDE ACETATE SCH MG: 50 TABLET ORAL at 08:38

## 2018-12-09 NOTE — DS
DATE OF DISCHARGE:  



HOSPITAL COURSE:  A 63-year-old female came in with chest discomfort as well as

palpitations and fluttering.  She had a complete syncopal spell where she passed

out completely and hit the floor and is out for several minutes before she was

aroused.  The patient was brought in to the hospital for further evaluation and

treatment.  The patient's cardiac enzymes were basically negative.  She made

good progress during the rest of her hospitalization.  Her TSH was 11.  However,

the rest of her labs look really fairly good without any complications there. 

The patient's chest x-ray was unremarkable.  In any case, the patient made good

progress during the rest of her hospitalization.  The patient will be discharged

home.  She will follow up with her cardiologist.



IMPRESSION:  Syncope, chest discomfort, hypothyroidism.



The patient will be followed up with her cardiologist, . ____.





______________________________

RADHA DOUGLAS MD



DR:  MONROE/kartik  JOB#:  [id number] / [id number]

DD:  12/09/2018 12:04  DT:  12/09/2018 12:48
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
## 2018-12-09 NOTE — RAD
PA and lateral chest x-ray

 

HISTORY: Syncope, dry cough.

 

COMPARISON: Chest x-ray May 11, 2017.

 

FINDINGS: Heart size normal. Mediastinal silhouette is normal. No 

pneumothorax, pulmonary opacities or pleural effusions. Mild thoracolumbar

scoliosis and sequela of thoracic spine degenerative disc disease.

 

IMPRESSION: No acute process.

 

Electronically signed by: Mono Mann MD (12/9/2018 8:10 AM) [facility]

## 2018-12-10 NOTE — EKG
[facility] [address], [place] [address]

Test Date:    2018               Test Time:    06:11:49

Pat Name:     DANITA BRAVO           Department:   

Patient ID:   [id number]           Room:         113 A

Gender:       F                        Technician:   LAUREN

:          1955               Requested By: RADHA DOUGLAS

Order Number: [id number]            Reading MD:     

                                 Measurements

Intervals                              Axis          

Rate:         53                       P:            49

AZ:           136                      QRS:          20

QRSD:         78                       T:            21

QT:           460                                    

QTc:          434                                    

                           Interpretive Statements

SINUS RHYTHM

QRS(T) CONTOUR ABNORMALITY

CONSIDER ANTEROSEPTAL MYOCARDIAL DAMAGE

POSSIBLY ABNORMAL ECG

RI6.01

Compared to ECG 2017 20:51:20

No significant changes

## 2019-06-07 ENCOUNTER — HOSPITAL ENCOUNTER (OUTPATIENT)
Dept: HOSPITAL 63 - MAMMO | Age: 64
Discharge: HOME | End: 2019-06-07
Payer: COMMERCIAL

## 2019-06-07 DIAGNOSIS — R92.1: Primary | ICD-10-CM

## 2019-06-07 PROCEDURE — 77066 DX MAMMO INCL CAD BI: CPT

## 2019-06-07 PROCEDURE — 76641 ULTRASOUND BREAST COMPLETE: CPT

## 2019-06-07 NOTE — RAD
DATE: 6/7/2019.



EXAM: MAMMO MURIEL JOSH SOLOMONAT, BREAST LEFT.



HISTORY: Left lower outer breast pain.



COMPARISON: 10/12/1970.



This study was interpreted with the benefit of Computerized Aided Detection

(CAD).



FINDINGS:



Breast Density:  SCATTERED The breast parenchyma shows scattered

fibroglandular densities. Breast parenchyma level B..



There are no suspicious masses, microcalcifications or architectural

distortion.  Scattered calcifications are benign. There is no correlate for

left breast tenderness.



On today's sonography, or normal parenchyma seen. There is no correlate for a

palpable or tender focus.



BI-RADS CATEGORY: 2 BENIGN FINDING(S).



RECOMMENDED FOLLOW-UP: 12M 12 MONTH FOLLOW-UP.

1. Ongoing clinical follow-up of left breast tenderness.

2. Bilateral mammography in one year.



PQRS compliance statement: Patient information was entered into a reminder

system with a target due date 6/7/2020 for the next mammogram.



Mammography is a sensitive method for finding small breast cancers, but it

does not detect them all and is not a substitute for careful clinical

examination.  A negative mammogram does not negate a clinically suspicious

finding and should not result in delay in biopsying a clinically suspicious

abnormality.



"Our facility is accredited by the American College of Radiology Mammography

Program."

## 2020-05-22 ENCOUNTER — HOSPITAL ENCOUNTER (OUTPATIENT)
Dept: HOSPITAL 61 - CCL | Age: 65
Discharge: HOME | End: 2020-05-22
Attending: INTERNAL MEDICINE
Payer: COMMERCIAL

## 2020-05-22 VITALS
SYSTOLIC BLOOD PRESSURE: 130 MMHG | DIASTOLIC BLOOD PRESSURE: 64 MMHG | DIASTOLIC BLOOD PRESSURE: 64 MMHG | SYSTOLIC BLOOD PRESSURE: 130 MMHG

## 2020-05-22 VITALS — SYSTOLIC BLOOD PRESSURE: 124 MMHG | DIASTOLIC BLOOD PRESSURE: 57 MMHG

## 2020-05-22 VITALS — DIASTOLIC BLOOD PRESSURE: 61 MMHG | SYSTOLIC BLOOD PRESSURE: 114 MMHG

## 2020-05-22 VITALS — SYSTOLIC BLOOD PRESSURE: 145 MMHG | DIASTOLIC BLOOD PRESSURE: 59 MMHG

## 2020-05-22 VITALS — SYSTOLIC BLOOD PRESSURE: 133 MMHG | DIASTOLIC BLOOD PRESSURE: 59 MMHG

## 2020-05-22 VITALS — SYSTOLIC BLOOD PRESSURE: 119 MMHG | DIASTOLIC BLOOD PRESSURE: 62 MMHG

## 2020-05-22 VITALS — DIASTOLIC BLOOD PRESSURE: 59 MMHG | SYSTOLIC BLOOD PRESSURE: 133 MMHG

## 2020-05-22 VITALS — DIASTOLIC BLOOD PRESSURE: 61 MMHG | SYSTOLIC BLOOD PRESSURE: 129 MMHG

## 2020-05-22 VITALS — SYSTOLIC BLOOD PRESSURE: 129 MMHG | DIASTOLIC BLOOD PRESSURE: 63 MMHG

## 2020-05-22 VITALS — DIASTOLIC BLOOD PRESSURE: 67 MMHG | SYSTOLIC BLOOD PRESSURE: 117 MMHG

## 2020-05-22 VITALS — HEIGHT: 62 IN | WEIGHT: 164 LBS | BODY MASS INDEX: 30.18 KG/M2

## 2020-05-22 DIAGNOSIS — I25.110: Primary | ICD-10-CM

## 2020-05-22 PROCEDURE — 99152 MOD SED SAME PHYS/QHP 5/>YRS: CPT

## 2020-05-22 PROCEDURE — C1769 GUIDE WIRE: HCPCS

## 2020-05-22 PROCEDURE — 93458 L HRT ARTERY/VENTRICLE ANGIO: CPT

## 2020-05-22 PROCEDURE — C1892 INTRO/SHEATH,FIXED,PEEL-AWAY: HCPCS

## 2020-05-22 PROCEDURE — 99153 MOD SED SAME PHYS/QHP EA: CPT

## 2020-05-22 NOTE — PDOC
MODERATE SEDATION ASSESSMENT


RISKS/ALTERNATIVES


Risks/Alternatives


Risks and alternatives of this type of sedation and procedure discussed with:


RISK/ALTERNATIVES:  Patient





H & P ON CHART


H & P


H & P on chart and reviewed for co-morbid conditions and appropriate labs.


H&P ON CHART:  Yes





PREGNANCY STATUS


PREG STATUS ASSESSED:  N/A





MEDS/ALLERGIES REVIEWED


Meds/Allergies Reviewed


Medications and Allergies including time and route of recently administered 

narcotics and sedatives.


MEDS/ALLERGIES REVIEWED:  Yes





ASA RATING


ASA RATING:  II





AIRWAY ASSESSMENT


Airway Assessment


Airway patency, oral function limitations, presence  of caps, crowns, dentures, 

partials, and ability to extend neck assessed.


AIRWAY ASSESSMENT:  Yes





MALLAMPATI SCORE


MALLAMPATI SCORE:  II





PRE-SEDATION ASSESSMENT


PRE-SEDATION ASSESSMENT:  Yes











SOPHIE FLOWERS MD           May 22, 2020 13:11

## 2020-05-22 NOTE — CARD
MR#: [id number]

Account#: [id number]

Accession#: [id number]

Date of Study: 05/22/2020

Ordering Physician: SOPHIE FLOWERS, 

Referring Physician: SOPHIE FLOWERS 

Tech: HERMES ALBRIGHT RTR





--------------- APPROVED REPORT --------------





Technologist: HERMES ALBRIGHT RTR

Nurse: Gabriela Charles R.N.



Procedure(s) performed: Left heart catheterization, selective coronary angiography and left ventricul
ography via right transradial approach



MODERATE SEDATION TIME: 34 MINS

FLUORO TIME: 3.5 MIN

DOSE: 37.9 GYCM2

CONTRAST: 73CC VISI



INDICATION

The indication(s) include : unstable angina .



CSHA Clinical Frailty Scale

CSHA Clinical Frailty Scale: Managing Well



Heart Failure

Heart Failure: No



PROCEDURE NARRATIVE

After explaining the risks, benefits and alternative options, informed consent was obtained from urbano
ent.  Patient was brought to the cardiac Cath Lab and right wrist was prepped and draped in the usual
 fashion after confirming a positive modified Tha's test.  Arterial access was obtained in the righ
t radial artery and a 6 Liberian sheath was inserted.  6 Liberian Kalen catheter was used to perform inez
ective angiography of the left and right coronary arteries.  6 Liberian pigtail catheter was used to pe
rform left ventriculography.  Patient tolerated the procedure well.  Hemostasis was achieved using TR
 band.  There were no immediate complications.  The following findings were noted.



FINDINGS

1.  Hemodynamics: Left ventricular end-diastolic pressure of 16 mmHg.  No pullback gradient across th
e aortic valve.

2.  Left ventriculography:  Normal left ventricle systolic function with ejection fraction estimated 
at 65%.  No significant mitral regurgitation seen.

3.  Coronary angiography:

a.  The left main coronary artery arose from the left sinus of Valsalva, gave rise to the left anteri
or descending and left circumflex arteries and did not show any significant stenosis.

b.  The left anterior descending artery did not show any significant stenosis.

c.  The left circumflex artery did not show any significant stenosis.

d.  The right coronary artery was a large and dominant vessel arising from the right sinus of Valsalv
a that showed 20 to 30% stenosis in the midsegment.



Conclusion

1.  No significant coronary disease

2.  Normal left ventricular systolic function with ejection fraction estimated at 65%.



Signed by : Sophie Flowers, 

Electronically Approved : 05/22/2020 13:20:47

## 2020-05-22 NOTE — NUR
Discharge Note:



ANDERSON BRAVO



Discharge instructions and discharge home medications reviewed with Patient and a copy 
given. All questions have been answered and understanding verbalized. 



The following instructions and handouts were given: Radial site care and Post moderate 
sedation



Discontinued lines and drains: right ac iv dc'd and tip intact.



Patient discharged to home with  via private car.

## 2020-09-17 ENCOUNTER — HOSPITAL ENCOUNTER (OUTPATIENT)
Dept: HOSPITAL 63 - MAMMO | Age: 65
Discharge: HOME | End: 2020-09-17
Payer: COMMERCIAL

## 2020-09-17 DIAGNOSIS — Z12.31: Primary | ICD-10-CM

## 2020-09-17 PROCEDURE — 77063 BREAST TOMOSYNTHESIS BI: CPT

## 2020-09-17 PROCEDURE — 77067 SCR MAMMO BI INCL CAD: CPT

## 2020-09-18 NOTE — RAD
DATE: 9/17/2020 3:35 PM



EXAM: MAMMO MURIEL SCREENING BILATERAL



HISTORY:  Screening



COMPARISON: 6/7/2019



Bilateral CC and MLO views of the breasts were performed. Bilateral breast

tomosynthesis was performed in CC and MLO projections.



This study was interpreted with the benefit of Computerized Aided Detection

(CAD).





FINDINGS:



Breast Density: SCATTERED  The breast parenchyma shows scattered

fibroglandular densities. Breast parenchyma level B





No suspicious masses, microcalcifications or architectural distortion is

present to suggest malignancy in either breast.





The visualized axillae are unremarkable. 



IMPRESSION: No mammographic evidence of malignancy. 



BI-RADS CATEGORY: 1 NEGATIVE



RECOMMENDED FOLLOW-UP: 12M 12 MONTH FOLLOW-UP Annual screening mammography is

recommended, unless clinically indicated sooner based on symptoms or change in

physical exam.



PQRS compliance statement: Patient information was entered into a reminder

system with a target due date for the next mammogram.



Mammography is a sensitive method for finding small breast cancers, but it

does not detect them all and is not a substitute for careful clinical

examination.  A negative mammogram does not negate a clinically suspicious

finding and should not result in delay in biopsying a clinically suspicious

abnormality.



"Our facility is accredited by the American College of Radiology Mammography

Program."

## 2021-10-11 ENCOUNTER — HOSPITAL ENCOUNTER (OUTPATIENT)
Dept: HOSPITAL 61 - KCIC MAMMO | Age: 66
End: 2021-10-11
Attending: OBSTETRICS & GYNECOLOGY
Payer: COMMERCIAL

## 2021-10-11 DIAGNOSIS — R92.1: ICD-10-CM

## 2021-10-11 DIAGNOSIS — Z12.31: Primary | ICD-10-CM

## 2021-10-11 PROCEDURE — 77063 BREAST TOMOSYNTHESIS BI: CPT

## 2021-10-11 PROCEDURE — 77067 SCR MAMMO BI INCL CAD: CPT

## 2021-10-11 NOTE — KCIC
Bilateral digital screening mammograms with 3-D tomosynthesis:



Reason for examination: Routine screening.



Comparison is made to previous studies dated back to 4/28/2015.



Bilateral mammograms in CC and oblique projections were obtained with 2-D imaging and 3-D tomosynthes
is imaging on a Siemens Inspiration unit and reviewed on the workstation. Interpretation was made wit
h the benefit of CAD.



The skin and nipples show no abnormalities. No abnormal axillary lymph nodes are seen. The breast par
enchyma shows scattered fatty and fibroglandular density. (Breast density: Category B.) There are no 
dominant masses, suspicious calcifications or architectural distortion. Benign calcifications are pre
sent.



Impression:



No evidence of malignancy. Recommend routine screening.



BI-RAD Category 2: Benign.



"Our facility is accredited by the American College of Radiology Mammography Program."



This patient's information has been entered into a reminder system for the patient to be notified wit
h the results of her examination and a target date for the next mammogram.



Electronically signed by: Catalina Emmanuel MD (10/11/2021 7:46 PM) [id number]

## 2022-05-30 ENCOUNTER — HOSPITAL ENCOUNTER (OUTPATIENT)
Dept: HOSPITAL 63 - ER | Age: 67
Setting detail: OBSERVATION
LOS: 1 days | Discharge: HOME | End: 2022-05-31
Attending: FAMILY MEDICINE | Admitting: FAMILY MEDICINE
Payer: COMMERCIAL

## 2022-05-30 VITALS — SYSTOLIC BLOOD PRESSURE: 149 MMHG | DIASTOLIC BLOOD PRESSURE: 83 MMHG

## 2022-05-30 VITALS — WEIGHT: 167.55 LBS | BODY MASS INDEX: 30.83 KG/M2 | HEIGHT: 62 IN

## 2022-05-30 VITALS — SYSTOLIC BLOOD PRESSURE: 127 MMHG | DIASTOLIC BLOOD PRESSURE: 70 MMHG

## 2022-05-30 VITALS — DIASTOLIC BLOOD PRESSURE: 73 MMHG | SYSTOLIC BLOOD PRESSURE: 127 MMHG

## 2022-05-30 VITALS — SYSTOLIC BLOOD PRESSURE: 170 MMHG | DIASTOLIC BLOOD PRESSURE: 81 MMHG

## 2022-05-30 VITALS — DIASTOLIC BLOOD PRESSURE: 83 MMHG | SYSTOLIC BLOOD PRESSURE: 154 MMHG

## 2022-05-30 DIAGNOSIS — E03.9: ICD-10-CM

## 2022-05-30 DIAGNOSIS — Z96.642: ICD-10-CM

## 2022-05-30 DIAGNOSIS — I10: ICD-10-CM

## 2022-05-30 DIAGNOSIS — Z79.899: ICD-10-CM

## 2022-05-30 DIAGNOSIS — I49.3: ICD-10-CM

## 2022-05-30 DIAGNOSIS — G47.33: ICD-10-CM

## 2022-05-30 DIAGNOSIS — R07.89: Primary | ICD-10-CM

## 2022-05-30 DIAGNOSIS — Z98.891: ICD-10-CM

## 2022-05-30 DIAGNOSIS — Z90.49: ICD-10-CM

## 2022-05-30 DIAGNOSIS — J45.909: ICD-10-CM

## 2022-05-30 DIAGNOSIS — Z20.822: ICD-10-CM

## 2022-05-30 LAB
ALBUMIN SERPL-MCNC: 3.7 G/DL (ref 3.4–5)
ALBUMIN/GLOB SERPL: 1.1 {RATIO} (ref 1–1.7)
ALP SERPL-CCNC: 69 U/L (ref 46–116)
ALT SERPL-CCNC: 25 U/L (ref 14–59)
ANION GAP SERPL CALC-SCNC: 10 MMOL/L (ref 6–14)
APTT PPP: YELLOW S
AST SERPL-CCNC: 17 U/L (ref 15–37)
BACTERIA #/AREA URNS HPF: 0 /HPF
BASOPHILS # BLD AUTO: 0 X10^3/UL (ref 0–0.2)
BASOPHILS NFR BLD: 1 % (ref 0–3)
BILIRUB SERPL-MCNC: 0.6 MG/DL (ref 0.2–1)
BUN/CREAT SERPL: 17 (ref 6–20)
CA-I SERPL ISE-MCNC: 15 MG/DL (ref 7–20)
CALCIUM SERPL-MCNC: 9.2 MG/DL (ref 8.5–10.1)
CHLORIDE SERPL-SCNC: 104 MMOL/L (ref 98–107)
CO2 SERPL-SCNC: 25 MMOL/L (ref 21–32)
CREAT SERPL-MCNC: 0.9 MG/DL (ref 0.6–1)
EOSINOPHIL NFR BLD: 0.3 X10^3/UL (ref 0–0.7)
EOSINOPHIL NFR BLD: 6 % (ref 0–3)
ERYTHROCYTE [DISTWIDTH] IN BLOOD BY AUTOMATED COUNT: 15.1 % (ref 11.5–14.5)
FIBRINOGEN PPP-MCNC: CLEAR MG/DL
GFR SERPLBLD BASED ON 1.73 SQ M-ARVRAT: 76.3 ML/MIN
GLOBULIN SER-MCNC: 3.4 G/DL (ref 2.2–3.8)
GLUCOSE SERPL-MCNC: 103 MG/DL (ref 70–99)
GLUCOSE UR STRIP-MCNC: (no result) MG/DL
HCT VFR BLD CALC: 39.5 % (ref 36–47)
HGB BLD-MCNC: 13.2 G/DL (ref 12–15.5)
LYMPHOCYTES # BLD: 2.1 X10^3/UL (ref 1–4.8)
LYMPHOCYTES NFR BLD AUTO: 48 % (ref 24–48)
MAGNESIUM SERPL-MCNC: 1.9 MG/DL (ref 1.8–2.4)
MCH RBC QN AUTO: 29 PG (ref 25–35)
MCHC RBC AUTO-ENTMCNC: 33 G/DL (ref 31–37)
MCV RBC AUTO: 86 FL (ref 79–100)
MONO #: 0.4 X10^3/UL (ref 0–1.1)
MONOCYTES NFR BLD: 8 % (ref 0–9)
NEUT #: 1.6 X10^3UL (ref 1.8–7.7)
NEUTROPHILS NFR BLD AUTO: 37 % (ref 31–73)
NITRITE UR QL STRIP: (no result)
PLATELET # BLD AUTO: 228 X10^3/UL (ref 140–400)
POTASSIUM SERPL-SCNC: 3.6 MMOL/L (ref 3.5–5.1)
PROT SERPL-MCNC: 7.1 G/DL (ref 6.4–8.2)
RBC # BLD AUTO: 4.59 X10^6/UL (ref 3.5–5.4)
RBC #/AREA URNS HPF: (no result) /HPF (ref 0–2)
SODIUM SERPL-SCNC: 139 MMOL/L (ref 136–145)
SP GR UR STRIP: 1.02
SQUAMOUS #/AREA URNS LPF: (no result) /LPF
UROBILINOGEN UR-MCNC: 0.2 MG/DL
WBC # BLD AUTO: 4.3 X10^3/UL (ref 4–11)
WBC #/AREA URNS HPF: (no result) /HPF (ref 0–4)

## 2022-05-30 PROCEDURE — 83735 ASSAY OF MAGNESIUM: CPT

## 2022-05-30 PROCEDURE — 85025 COMPLETE CBC W/AUTO DIFF WBC: CPT

## 2022-05-30 PROCEDURE — 80048 BASIC METABOLIC PNL TOTAL CA: CPT

## 2022-05-30 PROCEDURE — 80061 LIPID PANEL: CPT

## 2022-05-30 PROCEDURE — 36415 COLL VENOUS BLD VENIPUNCTURE: CPT

## 2022-05-30 PROCEDURE — 93005 ELECTROCARDIOGRAM TRACING: CPT

## 2022-05-30 PROCEDURE — U0003 INFECTIOUS AGENT DETECTION BY NUCLEIC ACID (DNA OR RNA); SEVERE ACUTE RESPIRATORY SYNDROME CORONAVIRUS 2 (SARS-COV-2) (CORONAVIRUS DISEASE [COVID-19]), AMPLIFIED PROBE TECHNIQUE, MAKING USE OF HIGH THROUGHPUT TECHNOLOGIES AS DESCRIBED BY CMS-2020-01-R: HCPCS

## 2022-05-30 PROCEDURE — 81001 URINALYSIS AUTO W/SCOPE: CPT

## 2022-05-30 PROCEDURE — 83880 ASSAY OF NATRIURETIC PEPTIDE: CPT

## 2022-05-30 PROCEDURE — 84443 ASSAY THYROID STIM HORMONE: CPT

## 2022-05-30 PROCEDURE — G0379 DIRECT REFER HOSPITAL OBSERV: HCPCS

## 2022-05-30 PROCEDURE — 80053 COMPREHEN METABOLIC PANEL: CPT

## 2022-05-30 PROCEDURE — 84484 ASSAY OF TROPONIN QUANT: CPT

## 2022-05-30 PROCEDURE — 96360 HYDRATION IV INFUSION INIT: CPT

## 2022-05-30 PROCEDURE — 87426 SARSCOV CORONAVIRUS AG IA: CPT

## 2022-05-30 PROCEDURE — G0378 HOSPITAL OBSERVATION PER HR: HCPCS

## 2022-05-30 PROCEDURE — 85379 FIBRIN DEGRADATION QUANT: CPT

## 2022-05-30 PROCEDURE — 96361 HYDRATE IV INFUSION ADD-ON: CPT

## 2022-05-30 PROCEDURE — 71045 X-RAY EXAM CHEST 1 VIEW: CPT

## 2022-05-30 RX ADMIN — SODIUM CHLORIDE SCH MLS/HR: 0.9 INJECTION, SOLUTION INTRAVENOUS at 23:10

## 2022-05-30 RX ADMIN — SODIUM CHLORIDE SCH MLS/HR: 0.9 INJECTION, SOLUTION INTRAVENOUS at 13:22

## 2022-05-30 NOTE — HP
DATE OF SERVICE: 2022

ADMIT DATE: 2022

HISTORY OF PRESENT ILLNESS:  A 66-year-old female who came in with chest 

pressure.  The patient has a long history of ventricular arrhythmias and has 

been worked up, had a stress test about a year ago, has multiple PVCs come and 

go.  However, the one today lasted over 24 minutes and began to have some chest 

pressure.  She says these are more severe than usual.  As a result of that, came

in through the Emergency Room because of the severity of her PVCs and the fact 

that the patient is 66 years old and family history of heart disease.  The 

patient was admitted for further evaluation and observation, rule out MI 

protocol.



PAST MEDICAL HISTORY:  The patient has had history of cardiac ablation, 

premature ventricular contractions, hypertension, sleep apnea, , 

orthopedic surgery, joint replacement, left hip scope, hypothyroidism, 

pneumococcal vaccinations.



FAMILY HISTORY:  Heart disease in the mother and paternal grandmother, cancer in

an aunt.



ALLERGIES:  The patient has no known drug allergies.



SOCIAL HISTORY:  The patient denies smoking, alcohol or drug us.  Is a medical 

technician.



MEDICATIONS:  The patient's home medications are metoprolol XL 25 mg daily, 

triamterene/hydrochlorothiazide daily, levothyroxine 125 mcg daily.



REVIEW OF SYSTEMS:  The patient denies any headaches, visual change, blurred 

vision, double vision.  Does have some shortness of breath.  Denies any nausea, 

vomiting, diaphoresis.  He does have chest tightness.  Denies any abdominal 

pain.  Denies any melena, hematochezia, hematemesis.  Neurologically, the 

patient basically is stable.  No problem with bowels or bladder.  No 

seizure-like activity.



PHYSICAL EXAMINATION:

GENERAL:  This is a pleasant white female, in moderate amount of discomfort.

VITAL SIGNS:  Blood pressure 183/96, respiratory rate 18, pulse in the 80s, 

afebrile and 98 on room air.

HEENT:  Head atraumatic, normocephalic.  Eyes:  PERRLA without jaundice.  The 

mouth and throat were normal.

NECK:  Supple without JVD or thyromegaly.

LUNGS:  Diminished, but clear throughout.

CARDIOVASCULAR:  Regular with occasional dropped beat.

ABDOMEN:  The patient's abdomen is soft, nontender.  No rebounding or guarding. 

Positive bowel sounds.  No hepatosplenomegaly was noted.

EXTREMITIES:  No clubbing, cyanosis, nor edema.

NEUROLOGIC:  The patient was alert and oriented x 3.  Speech fluent, 

spontaneous, and appropriate.  Cranial nerves 2-12 are grossly intact.



DIAGNOSTIC DATA:  Chest x-ray was unremarkable.



LABORATORY DATA:  The patient's labs were basically unremarkable as well.  SARS 

was negative.  CBC:  WBC 4.3, hemoglobin 13 and hematocrit 39.  Chemistry:  

Sodium 139, potassium 3.6, BUN and creatinine 15 and 0.9, blood sugar 103, 

magnesium 1.9, albumin 3.7.  Urine negative.  SARS negative.



ASSESSMENT AND PLAN:  The patient will be admitted for rule out MI protocol.  So

far, they have been negative.  We will have Cardiology see her and make further 

evaluation, observation and evaluation on her as indicated.







ELLI

DR: Ede   DD: 2022 14:35

DT: 2022 14:50   TID: [id number]

## 2022-05-30 NOTE — RAD
XR CHEST 1V 5/30/2022 9:49 AM



INDICATION: Chest pain



COMPARISON: 12/8/2018



TECHNIQUE: Portable frontal view of the chest is provided.



FINDINGS:



The cardiomediastinal silhouette is within normal limits. Lungs are clear.



There are no significant pleural effusions. There is no pulmonary vascular congestion. No pneumothora
x.



No suspicious osseous abnormality.



IMPRESSION:



There is no acute cardiopulmonary process.



Electronically signed by: Destiney Harris MD (5/30/2022 10:14 AM) [id number]

## 2022-05-30 NOTE — PHYS DOC
Past History


Past Medical History:  Hypertension, Hypothyroid, Other


Additional Past Medical Histor:  PVC'S


Past Surgical History:  , Tonsillectomy, Other


Additional Past Surgical Histo:  LEFT HIP SCOPE


Smoking:  Non-smoker


Alcohol Use:  None


Drug Use:  None





Adult General


Chief Complaint


Chief Complaint:  CHEST PAIN





HPI


HPI





Patient is a 66 year old female who presents with complaint of chest pain.  The 

patient states that her pain originates along the left side of her chest and 

radiates towards her left arm.  Pain started this morning at 0630.  Patient also

notes that she has been having frequent PVCs.  Has had previous history of PVCs 

and underwent an ablation about 2 years ago but states that her PVCs started 

returning within the last year.  The patient states that her pain remains 

constant and does seem to worsen when she is up and moving.  Patient states that

she underwent cardiac stress testing a year ago with no positive findings for 

ischemia.  Denies fever, cough, shortness of breath, vomiting, diaphoresis, or 

diarrhea.  Currently follows with Dr. Sheehan of cardiology and Dr. Douglas for 

primary care.





Review of Systems


Review of Systems





Constitutional: Denies fever or chills []


Eyes: Denies change in visual acuity, redness, or eye pain []


HENT: Denies nasal congestion or sore throat []


Respiratory: Denies cough or shortness of breath []


Cardiovascular: Chest pain, denies edema []


GI: Denies abdominal pain, nausea, vomiting, bloody stools or diarrhea []


: Denies dysuria or hematuria []


Musculoskeletal: Denies back pain or joint pain []


Integument: Denies rash or skin lesions []


Neurologic: Denies headache, focal weakness or sensory changes []








All other systems were reviewed and found to be within normal limits, except as 

documented in this note.





Allergies


Allergies





Allergies








Coded Allergies Type Severity Reaction Last Updated Verified


 


  No Known Drug Allergies    22 No











Physical Exam


Physical Exam





Constitutional: Well developed, well nourished, no acute distress, non-toxic 

appearance. []


HENT: Normocephalic, atraumatic, bilateral external ears normal, oropharynx 

moist, no oral exudates, nose normal. []


Eyes: PERRLA, EOMI, conjunctiva normal, no discharge. [] 


Neck: Normal range of motion, no tenderness, supple, no stridor. [] 


Cardiovascular:Heart rate regular rhythm, no murmur []


Lungs & Thorax:  Bilateral breath sounds clear to auscultation []


Abdomen: Bowel sounds normal, soft, no tenderness, no masses, no pulsatile 

masses. [] 


Skin: Warm, dry, no erythema, no rash. [] 


Back: No tenderness, no CVA tenderness. [] 


Extremities: No tenderness, no cyanosis, no clubbing, ROM intact, no edema. [] 


Neurologic: Alert and oriented X 3, normal motor function, normal sensory 

function, no focal deficits noted. []





Current Patient Data


Vital Signs





                                   Vital Signs








  Date Time  Temp Pulse Resp B/P (MAP) Pulse Ox O2 Delivery O2 Flow Rate FiO2


 


22 08:59 98.5 67 18 172/68 (102) 98 Room Air  








Lab Results





Laboratory Tests








Test


 22


09:20 22


09:30


 


White Blood Count 4.3 x10^3/uL  


 


Red Blood Count 4.59 x10^6/uL  


 


Hemoglobin 13.2 g/dL  


 


Hematocrit 39.5 %  


 


Mean Corpuscular Volume 86 fL  


 


Mean Corpuscular Hemoglobin 29 pg  


 


Mean Corpuscular Hemoglobin


Concent 33 g/dL 


 





 


Red Cell Distribution Width 15.1 %  


 


Platelet Count 228 x10^3/uL  


 


Neutrophils (%) (Auto) 37 %  


 


Lymphocytes (%) (Auto) 48 %  


 


Monocytes (%) (Auto) 8 %  


 


Eosinophils (%) (Auto) 6 %  


 


Basophils (%) (Auto) 1 %  


 


Neutrophils # (Auto) 1.6 x10^3uL  


 


Lymphocytes # (Auto) 2.1 x10^3/uL  


 


Monocytes # (Auto) 0.4 x10^3/uL  


 


Eosinophils # (Auto) 0.3 x10^3/uL  


 


Basophils # (Auto) 0.0 x10^3/uL  


 


Sodium Level 139 mmol/L  


 


Potassium Level 3.6 mmol/L  


 


Chloride Level 104 mmol/L  


 


Carbon Dioxide Level 25 mmol/L  


 


Anion Gap 10  


 


Blood Urea Nitrogen 15 mg/dL  


 


Creatinine 0.9 mg/dL  


 


Estimated GFR


(Cockcroft-Gault) 76.3 


 





 


BUN/Creatinine Ratio 17  


 


Glucose Level 103 mg/dL  


 


Calcium Level 9.2 mg/dL  


 


Magnesium Level 1.9 mg/dL  


 


Total Bilirubin 0.6 mg/dL  


 


Aspartate Amino Transf


(AST/SGOT) 17 U/L 


 





 


Alanine Aminotransferase


(ALT/SGPT) 25 U/L 


 





 


Alkaline Phosphatase 69 U/L  


 


Troponin I High Sensitivity 6 ng/L  


 


NT-Pro-B-Type Natriuretic


Peptide 90 pg/mL 


 





 


Total Protein 7.1 g/dL  


 


Albumin 3.7 g/dL  


 


Albumin/Globulin Ratio 1.1  


 


Urine Collection Type  Unknown 


 


Urine Color  Yellow 


 


Urine Clarity  Clear 


 


Urine pH  7.0 


 


Urine Specific Gravity  1.020 


 


Urine Protein  Neg 


 


Urine Glucose (UA)  Neg mg/dL 


 


Urine Ketones (Stick)  Neg mg/dL 


 


Urine Blood  Neg 


 


Urine Nitrite  Neg 


 


Urine Bilirubin  Neg 


 


Urine Urobilinogen Dipstick  0.2 mg/dL 


 


Urine Leukocyte Esterase  Neg 


 


Urine RBC  Occ /HPF 


 


Urine WBC  Occ /HPF 


 


Urine Squamous Epithelial


Cells 


 Few /LPF 





 


Urine Bacteria  0 /HPF 








Current Medications








 Medications


  (Trade)  Dose


 Ordered  Sig/Lilo


 Route


 PRN Reason  Start Time


 Stop Time Status Last Admin


Dose Admin


 


 Ondansetron HCl


  (Zofran)  4 mg  PRN Q4HRS  PRN


 IVP


 NAUSEA/VOMITING  22 10:30


 22 10:29   





 


 Sodium Chloride  1,000 ml @ 


 125 mls/hr  Q8H


 IV


   22 10:30


 22 10:29   





 


 Acetaminophen


  (Tylenol)  650 mg  PRN Q4HRS  PRN


 PO


 FEVER > 100.3'F  22 10:30


 22 10:29   














EKG


EKG


Interpreted by me: Heart rate 63, sinus rhythm, multiple PVCs, no acute ST/T 

wave abnormalities present []





Radiology/Procedures


Radiology/Procedures


[facility] [address], [place] [address]


                                 [phone]


                                        


                                 IMAGING REPORT





                                     Signed





PATIENT: DANITA BRAVO   ACCOUNT: [id number]     MRN#: [id number]


: 1955           LOCATION: ER              AGE: 66


SEX: F                    EXAM DT: 22         ACCESSION#: [id number]


STATUS: REG ER            ORD. PHYSICIAN: CONNER CHEUNG MD


REASON: Chest pain


PROCEDURE: PORTABLE CHEST 1V





XR CHEST 1V 2022 9:49 AM





INDICATION: Chest pain





COMPARISON: 2018





TECHNIQUE: Portable frontal view of the chest is provided.





FINDINGS:





The cardiomediastinal silhouette is within normal limits. Lungs are clear.





There are no significant pleural effusions. There is no pulmonary vascular 

congestion. No pneumothorax.





No suspicious osseous abnormality.





IMPRESSION:





There is no acute cardiopulmonary process.





Electronically signed by: Christi Coates MD (2022 10:14 AM) [id number]














DICTATED AND SIGNED BY:     CHRISTI COATES MD


DATE:     22 1013





CC: RADHA DOUGLAS MD; CONNER CHEUNG MD ~


[]





Heart Score


C/O Chest Pain:  Yes


HEART Score for Chest Pain:  








HEART Score for Chest Pain Response (Comments) Value


 


History Moderately Suspicious 1


 


ECG Nonspecific Repolarizatio 1


 


Age >45 - < 65 1


 


Risk Factors >3 Risk Factors or Hx CAD 2


 


Total  5








Risk Factors:


Risk Factors:  DM, Current or recent (<one month) smoker, HTN, HLP, family 

history of CAD, obesity.


Risk Scores:


Risk Factors:  DM, Current or recent (<one month) smoker, HTN, HLP, family 

history of CAD, obesity.





Course & Med Decision Making


Course & Med Decision Making


Pertinent Labs and Imaging studies reviewed. (See chart for details)





Blood work was reviewed and appears stable at this time.  Patient's heart score 

is 5 placing her in moderate risk category for acute coronary syndrome.  Patient

 thus will be admitted to the hospital for continued noninvasive testing to rule

 out myocardial infarction.  I spoke with patient's primary care physician, Dr. Douglas, who will accept care of patient in hospital for further care.  A 

routine consult was placed to cardiology services to follow with patient in 

hospital.  []





Dragon Disclaimer


Dragon Disclaimer


This electronic medical record was generated, in whole or in part, using a voice

 recognition dictation system.





Departure


Departure:


Impression:  


   Primary Impression:  


   Chest pain


   Additional Impression:  


   PVCs (premature ventricular contractions)


Disposition:   ADMITTED AS INPATIENT


Condition:  STABLE


Referrals:  


RADHA DOUGLAS MD (PCP)





Problem Qualifiers








   Primary Impression:  


   Chest pain


   Chest pain type:  unspecified  Qualified Codes:  R07.9 - Chest pain, unspeci

   fied








CONNER CHEUNG MD               May 30, 2022 09:47

## 2022-05-31 VITALS — DIASTOLIC BLOOD PRESSURE: 78 MMHG | SYSTOLIC BLOOD PRESSURE: 156 MMHG

## 2022-05-31 VITALS — DIASTOLIC BLOOD PRESSURE: 83 MMHG | SYSTOLIC BLOOD PRESSURE: 157 MMHG

## 2022-05-31 LAB
ANION GAP SERPL CALC-SCNC: 7 MMOL/L (ref 6–14)
BASOPHILS # BLD AUTO: 0 X10^3/UL (ref 0–0.2)
BASOPHILS NFR BLD: 1 % (ref 0–3)
CA-I SERPL ISE-MCNC: 11 MG/DL (ref 7–20)
CALCIUM SERPL-MCNC: 8.5 MG/DL (ref 8.5–10.1)
CHLORIDE SERPL-SCNC: 108 MMOL/L (ref 98–107)
CHOLEST/HDLC SERPL: 2.1 {RATIO}
CO2 SERPL-SCNC: 25 MMOL/L (ref 21–32)
CREAT SERPL-MCNC: 0.8 MG/DL (ref 0.6–1)
EOSINOPHIL NFR BLD: 0.3 X10^3/UL (ref 0–0.7)
EOSINOPHIL NFR BLD: 7 % (ref 0–3)
ERYTHROCYTE [DISTWIDTH] IN BLOOD BY AUTOMATED COUNT: 14.8 % (ref 11.5–14.5)
GFR SERPLBLD BASED ON 1.73 SQ M-ARVRAT: 87.4 ML/MIN
GLUCOSE SERPL-MCNC: 108 MG/DL (ref 70–99)
HCT VFR BLD CALC: 36.5 % (ref 36–47)
HDLC SERPL-MCNC: 91 MG/DL (ref 40–60)
HGB BLD-MCNC: 12.4 G/DL (ref 12–15.5)
LDLC: 85 MG/DL (ref 0–100)
LYMPHOCYTES # BLD: 1.8 X10^3/UL (ref 1–4.8)
LYMPHOCYTES NFR BLD AUTO: 49 % (ref 24–48)
MCH RBC QN AUTO: 29 PG (ref 25–35)
MCHC RBC AUTO-ENTMCNC: 34 G/DL (ref 31–37)
MCV RBC AUTO: 86 FL (ref 79–100)
MONO #: 0.3 X10^3/UL (ref 0–1.1)
MONOCYTES NFR BLD: 8 % (ref 0–9)
NEUT #: 1.3 X10^3UL (ref 1.8–7.7)
NEUTROPHILS NFR BLD AUTO: 36 % (ref 31–73)
PLATELET # BLD AUTO: 219 X10^3/UL (ref 140–400)
POTASSIUM SERPL-SCNC: 3.7 MMOL/L (ref 3.5–5.1)
RBC # BLD AUTO: 4.26 X10^6/UL (ref 3.5–5.4)
SODIUM SERPL-SCNC: 140 MMOL/L (ref 136–145)
TRIGL SERPL-MCNC: 78 MG/DL (ref 0–150)
VLDLC: 16 MG/DL (ref 0–40)
WBC # BLD AUTO: 3.7 X10^3/UL (ref 4–11)

## 2022-05-31 RX ADMIN — SODIUM CHLORIDE SCH MLS/HR: 0.9 INJECTION, SOLUTION INTRAVENOUS at 02:30

## 2022-05-31 NOTE — PDOC2
CARDIAC CONSULT


DATE OF CONSULT


DOS:


DATE: 5/31/22 


TIME: 07:57





REASON FOR CONSULT


Reason for Consult


Chest pain





REFERRING PHYSICIAN


Referring Physician


Dr. Reyna





SOURCE


Source:  Chart review, Patient





HPI


History of Present Illness


This is a 67 yo female who presented secondary to chest pain. Patient reports 

history of frequent PVC's and was treated with flecainide and metoprolol. She 

underwent PVC ablation in August of 2020 by Dr. Tovar. Flecainide was dis

continued at that time. Reports reoccurrence of PVC's intermittent since last 

December. Episode are generally brief and resolve. Yesterday, she began having 

palpitations and they persisted. Has dull pain in her left chest. Seemed to be 

worse with activity, exertion. No associated shortness of breath, dizziness, 

diaphoresis, or nausea/vomiting. Reports compliance with medications.


Recent 2-day Holter showed PVC burden of 11%. Also had recent stress test that 

did not show any evidence of ischemia. [facility] 5/2020 without significant CAD.





PAST MEDICAL HISTORY


Cardiovascular:  HTN, Other (Frequent PVC's )


Pulmonary:  Asthma, Other (MONTEZ)


Psych:  Anxiety


Endocrine:  Hypothyroidism





PAST SURGICAL HISTORY


Past Surgical History:  Other (cardiac ablation )





FAMILY HISTORY


Family History:  Heart Disease





SOCIAL HISTORY


Smoke:  No


ALCOHOL:  none


Drugs:  None


Lives:  with Family





CURRENT MEDICATIONS


Current Medications





Current Medications


Ondansetron HCl (Zofran) 4 mg PRN Q4HRS  PRN IVP NAUSEA/VOMITING;  Start 5/30/22

at 10:30;  Stop 5/31/22 at 10:29


Sodium Chloride 1,000 ml @  125 mls/hr Q8H IV  Last administered on 5/30/22at 

23:10;  Start 5/30/22 at 10:30;  Stop 5/31/22 at 10:29


Acetaminophen (Tylenol) 650 mg PRN Q4HRS  PRN PO FEVER > 100.3'F;  Start 5/30/22

at 10:30;  Stop 5/31/22 at 10:29


Levothyroxine Sodium (Synthroid) 125 mcg DAILY06 PO  Last administered on 

5/31/22at 06:00;  Start 5/31/22 at 06:00


Metoprolol Succinate (Toprol Xl) 25 mg DAILY PO ;  Start 5/31/22 at 09:00


Triamterene/HCTZ (Maxzide 37.5/ 25mg) 1 tab DAILY PO ;  Start 5/31/22 at 09:00





Active Scripts


Active


Reported


Synthroid (Levothyroxine Sodium) 125 Mcg Tablet 1 Tab PO DAILY


Metoprolol Succinate ( Xl ) (Metoprolol Succinate) 25 Mg Tab.er.24h 1 Tab PO 

DAILY


Triamterene-Hctz 37.5-25 Mg Cp (Triamterene/Hydrochlorothiazid) 1 Each Capsule 1

Each PO DAILY


     NOT GIVEN IN THE HOSPITAL


     NEXT DOSE DUE:


     DATE: RESTART TOMORROW


     TIME: AM





ALLERGIES


Allergies:  


Coded Allergies:  


     No Known Drug Allergies (Unverified , 5/30/22)





ROS


Review of Systems


14 point ROS conducted with pertinent positives noted above in HPI





PHYSICAL EXAM


General:  Alert, Oriented X3, Cooperative, No acute distress


HEENT:  Atraumatic


Lungs:  Clear to auscultation


Heart:  Regular rate (SR with frequent PVCs)


Abdomen:  Soft, No tenderness


Extremities:  No edema, Normal pulses


Skin:  No breakdown


Neuro:  Normal speech, Sensation intact


Psych/Mental Status:  Mental status NL, Mood NL


MUSCULOSKELETAL:  Osteoarthritic changes both hands





VITALS


Vital Signs





Vital Signs








  Date Time  Temp Pulse Resp B/P (MAP) Pulse Ox O2 Delivery O2 Flow Rate FiO2


 


5/31/22 05:00 98.2 65 16 157/83 (107) 97 Room Air  











LABS


LABS





Laboratory Tests








Test


 5/30/22


09:20 5/30/22


09:30 5/30/22


10:30 5/30/22


12:20


 


White Blood Count


 4.3 x10^3/uL


(4.0-11.0) 


 


 





 


Red Blood Count


 4.59 x10^6/uL


(3.50-5.40) 


 


 





 


Hemoglobin


 13.2 g/dL


(12.0-15.5) 


 


 





 


Hematocrit


 39.5 %


(36.0-47.0) 


 


 





 


Mean Corpuscular Volume 86 fL ()    


 


Mean Corpuscular Hemoglobin 29 pg (25-35)    


 


Mean Corpuscular Hemoglobin


Concent 33 g/dL


(31-37) 


 


 





 


Red Cell Distribution Width


 15.1 %


(11.5-14.5) 


 


 





 


Platelet Count


 228 x10^3/uL


(140-400) 


 


 





 


Neutrophils (%) (Auto) 37 % (31-73)    


 


Lymphocytes (%) (Auto) 48 % (24-48)    


 


Monocytes (%) (Auto) 8 % (0-9)    


 


Eosinophils (%) (Auto) 6 % (0-3)    


 


Basophils (%) (Auto) 1 % (0-3)    


 


Neutrophils # (Auto)


 1.6 x10^3uL


(1.8-7.7) 


 


 





 


Lymphocytes # (Auto)


 2.1 x10^3/uL


(1.0-4.8) 


 


 





 


Monocytes # (Auto)


 0.4 x10^3/uL


(0.0-1.1) 


 


 





 


Eosinophils # (Auto)


 0.3 x10^3/uL


(0.0-0.7) 


 


 





 


Basophils # (Auto)


 0.0 x10^3/uL


(0.0-0.2) 


 


 





 


Sodium Level


 139 mmol/L


(136-145) 


 


 





 


Potassium Level


 3.6 mmol/L


(3.5-5.1) 


 


 





 


Chloride Level


 104 mmol/L


() 


 


 





 


Carbon Dioxide Level


 25 mmol/L


(21-32) 


 


 





 


Anion Gap 10 (6-14)    


 


Blood Urea Nitrogen


 15 mg/dL


(7-20) 


 


 





 


Creatinine


 0.9 mg/dL


(0.6-1.0) 


 


 





 


Estimated GFR


(Cockcroft-Gault) 76.3 


 


 


 





 


BUN/Creatinine Ratio 17 (6-20)    


 


Glucose Level


 103 mg/dL


(70-99) 


 


 





 


Calcium Level


 9.2 mg/dL


(8.5-10.1) 


 


 





 


Magnesium Level


 1.9 mg/dL


(1.8-2.4) 


 


 





 


Total Bilirubin


 0.6 mg/dL


(0.2-1.0) 


 


 





 


Aspartate Amino Transf


(AST/SGOT) 17 U/L (15-37) 


 


 


 





 


Alanine Aminotransferase


(ALT/SGPT) 25 U/L (14-59) 


 


 


 





 


Alkaline Phosphatase


 69 U/L


() 


 


 





 


Troponin I High Sensitivity 6 ng/L (4-50)    6 ng/L (4-50) 


 


NT-Pro-B-Type Natriuretic


Peptide 90 pg/mL


(0-124) 


 


 





 


Total Protein


 7.1 g/dL


(6.4-8.2) 


 


 





 


Albumin


 3.7 g/dL


(3.4-5.0) 


 


 





 


Albumin/Globulin Ratio 1.1 (1.0-1.7)    


 


Urine Collection Type  Unknown   


 


Urine Color  Yellow   


 


Urine Clarity  Clear   


 


Urine pH  7.0   


 


Urine Specific Gravity  1.020   


 


Urine Protein


 


 Neg


(NEG-TRACE) 


 





 


Urine Glucose (UA)


 


 Neg mg/dL


(NEG) 


 





 


Urine Ketones (Stick)


 


 Neg mg/dL


(NEG) 


 





 


Urine Blood  Neg (NEG)   


 


Urine Nitrite  Neg (NEG)   


 


Urine Bilirubin  Neg (NEG)   


 


Urine Urobilinogen Dipstick


 


 0.2 mg/dL (0.2


mg/dL) 


 





 


Urine Leukocyte Esterase  Neg (NEG)   


 


Urine RBC  Occ /HPF (0-2)   


 


Urine WBC  Occ /HPF (0-4)   


 


Urine Squamous Epithelial


Cells 


 Few /LPF 


 


 





 


Urine Bacteria  0 /HPF (0-FEW)   


 


SARS-CoV-2 Antigen (Rapid)


 


 


 Negative


(NEGATIVE) 





 


Test


 5/30/22


15:25 5/31/22


05:37 


 





 


D-Dimer (Stefani)


 0.30 mg/L


(0.00-0.50) 


 


 





 


Troponin I High Sensitivity 7 ng/L (4-50)    


 


White Blood Count


 


 3.7 x10^3/uL


(4.0-11.0) 


 





 


Red Blood Count


 


 4.26 x10^6/uL


(3.50-5.40) 


 





 


Hemoglobin


 


 12.4 g/dL


(12.0-15.5) 


 





 


Hematocrit


 


 36.5 %


(36.0-47.0) 


 





 


Mean Corpuscular Volume  86 fL ()   


 


Mean Corpuscular Hemoglobin  29 pg (25-35)   


 


Mean Corpuscular Hemoglobin


Concent 


 34 g/dL


(31-37) 


 





 


Red Cell Distribution Width


 


 14.8 %


(11.5-14.5) 


 





 


Platelet Count


 


 219 x10^3/uL


(140-400) 


 





 


Neutrophils (%) (Auto)  36 % (31-73)   


 


Lymphocytes (%) (Auto)  49 % (24-48)   


 


Monocytes (%) (Auto)  8 % (0-9)   


 


Eosinophils (%) (Auto)  7 % (0-3)   


 


Basophils (%) (Auto)  1 % (0-3)   


 


Neutrophils # (Auto)


 


 1.3 x10^3uL


(1.8-7.7) 


 





 


Lymphocytes # (Auto)


 


 1.8 x10^3/uL


(1.0-4.8) 


 





 


Monocytes # (Auto)


 


 0.3 x10^3/uL


(0.0-1.1) 


 





 


Eosinophils # (Auto)


 


 0.3 x10^3/uL


(0.0-0.7) 


 





 


Basophils # (Auto)


 


 0.0 x10^3/uL


(0.0-0.2) 


 





 


Sodium Level


 


 140 mmol/L


(136-145) 


 





 


Potassium Level


 


 3.7 mmol/L


(3.5-5.1) 


 





 


Chloride Level


 


 108 mmol/L


() 


 





 


Carbon Dioxide Level


 


 25 mmol/L


(21-32) 


 





 


Anion Gap  7 (6-14)   


 


Blood Urea Nitrogen


 


 11 mg/dL


(7-20) 


 





 


Creatinine


 


 0.8 mg/dL


(0.6-1.0) 


 





 


Estimated GFR


(Cockcroft-Gault) 


 87.4 


 


 





 


Glucose Level


 


 108 mg/dL


(70-99) 


 





 


Calcium Level


 


 8.5 mg/dL


(8.5-10.1) 


 














EKG


EKG





Holter Monitor   


Hookup date: 1/10/2022.


Scan date: 1/18/2022. Start time: 09:00 CST.


Scanned by: Newsela





Patient monitored for 2d 3h starting on 01/10/2022 09:02 am.


Primary rhythm was Sinus Rhythm. Average heart rate was 79 bpm, Minimum heart 

rate was 61 bpm on Day 1 /


10:55:37 pm, Max heart rate was 121 bpm on Day 2 / 07:40:25 pm


Atrial Fibrillation or Flutter: [place] was 0 %, longest event 0 ms on --, 

fastest rate -- bpm on --.


PSVC(s): [place] was < 0.01 %, max count per 24 hours 6


SVT (AT, RT): 0 events, longest event 0 s on --, fastest event -- bpm on --


Pause: 0 events, longest pause 0 ms on --


AV Block: 0 %, most severe block demonstrated --


PVC(s): [place] was 11.07 %, max count per 24 hours 31562, 5 disparate 

morphologies


Ventricular Tachycardia: 1 events, longest event 4 beats at Day 1 / 11:40:54 pm,

fastest rate 100 bpm at Day 1 / 11:40:54 pm


Patient recorded 9 events during the monitoring period





ECHOCARDIOGRAM


Echocardiogram


2/28/20 -  2-D + DOPPLER ECHOCARDIOGRAM





Interpretation Summary


Normal chamber sizes


Normal left ventricular systolic function with EF = 65%


Normal cardiac valve structure


Mild mitral and tricuspid valve regurgitation


Normal pulmonary artery pressure with estimated systolic PAP = 28mm Hg





STRESS TEST


Stress Test


1/25/22 - Procedure: D-SPECT MULTI GATED THALLIUM REGADENOSON MPI STRESS TEST





SUMMARY/OPINION:  This study is normal with no evidence of significant 

myocardial ischemia. Left ventricular systolic function is normal. There are no 

high risk prognostic indicators present.  The pharmacologic ECG portion of the 

study is negative for ischemia.  Frequent monomorphic PVCs, at times in a 

bigeminal pattern.


 


Comparison is made with prior exercise DSPECT MPI dated 3/8/2019.  This study 

was normal with no evidence of significant myocardial ischemia.  LVEF 72%, LVEDV

49 mL.  Comparing both studies qualitatively there been no significant interval 

changes.


 


This study was read in conjunction with cardiovascular fellow, Dr. Carlos Mukherjee.


 


In aggregate the current study is low risk in regards to predicted annual 

cardiovascular mortality rate.





HEART CATH


Heart Cath


FINDINGS


1.  Hemodynamics: Left ventricular end-diastolic pressure of 16 mmHg.  No 

pullback gradient across the aortic valve.


2.  Left ventriculography:  Normal left ventricle systolic function with 

ejection fraction estimated at 65%.  No significant mitral regurgitation seen.


3.  Coronary angiography:


a.  The left main coronary artery arose from the left sinus of Valsalva, gave 

rise to the left anterior descending and left circumflex arteries and did not 

show any significant stenosis.


b.  The left anterior descending artery did not show any significant stenosis.


c.  The left circumflex artery did not show any significant stenosis.


d.  The right coronary artery was a large and dominant vessel arising from the 

right sinus of Valsalva that showed 20 to 30% stenosis in the midsegment.





Conclusion


1.  No significant coronary disease


2.  Normal left ventricular systolic function with ejection fraction estimated 

at 65%.





DATE:     05/22/20 1317





ASSESSMENT/PLAN


Assessment/Plan


1.  Chest pain, atypical. AMI ruled out. Recent MPI without evidence of ischemia

as noted above. [facility] 5/2020 without significant CAD as noted above. Suspect 

symptoms are related to frequent PVC's, which she has experienced in the past


2.  Arrhythmia; frequent PVC's. s/p PVC ablation 8/2020. Was take off flecainide

following. On metoprolol for rate control. Recetn 2-day Holter showed 11% PVC 

burden as noted above  Follow with [facility] EPDr. Tovar 


3.  Hypertension; controlled  


4.  MONTEZ with CPAP


5.  Hypothyroidism








Recommendations





Check TSH


Continue BB therapy 


Consider resumption of antiarrhythmic therapy with flecainide and outpatient 

event monitor. Will d/w primary cardiologist 


Supportive care











AMISH LEARY           May 31, 2022 08:01